# Patient Record
Sex: FEMALE | Race: WHITE | NOT HISPANIC OR LATINO | Employment: UNEMPLOYED | URBAN - METROPOLITAN AREA
[De-identification: names, ages, dates, MRNs, and addresses within clinical notes are randomized per-mention and may not be internally consistent; named-entity substitution may affect disease eponyms.]

---

## 2017-05-08 ENCOUNTER — TRANSCRIBE ORDERS (OUTPATIENT)
Dept: ADMINISTRATIVE | Facility: HOSPITAL | Age: 32
End: 2017-05-08

## 2017-05-08 ENCOUNTER — HOSPITAL ENCOUNTER (OUTPATIENT)
Dept: GENERAL RADIOLOGY | Facility: HOSPITAL | Age: 32
Discharge: HOME | End: 2017-05-08
Admitting: NURSE PRACTITIONER

## 2017-05-08 DIAGNOSIS — R07.81 RIB PAIN ON RIGHT SIDE: Primary | ICD-10-CM

## 2017-05-08 PROCEDURE — 71100 X-RAY EXAM RIBS UNI 2 VIEWS: CPT

## 2017-06-01 ENCOUNTER — APPOINTMENT (OUTPATIENT)
Dept: LAB | Facility: HOSPITAL | Age: 32
End: 2017-06-01
Attending: OBSTETRICS & GYNECOLOGY

## 2017-06-01 ENCOUNTER — OFFICE VISIT (OUTPATIENT)
Dept: OBSTETRICS AND GYNECOLOGY | Facility: CLINIC | Age: 32
End: 2017-06-01

## 2017-06-01 VITALS
HEIGHT: 65 IN | DIASTOLIC BLOOD PRESSURE: 60 MMHG | WEIGHT: 139 LBS | SYSTOLIC BLOOD PRESSURE: 112 MMHG | BODY MASS INDEX: 23.16 KG/M2

## 2017-06-01 DIAGNOSIS — N39.490 OVERFLOW INCONTINENCE: ICD-10-CM

## 2017-06-01 DIAGNOSIS — Z01.419 WOMEN'S ANNUAL ROUTINE GYNECOLOGICAL EXAMINATION: Primary | ICD-10-CM

## 2017-06-01 LAB
BILIRUB UR QL STRIP: NEGATIVE
CLARITY UR: CLEAR
COLOR UR: YELLOW
GLUCOSE UR STRIP-MCNC: NEGATIVE MG/DL
HGB UR QL STRIP.AUTO: NEGATIVE
KETONES UR QL STRIP: NEGATIVE
LEUKOCYTE ESTERASE UR QL STRIP.AUTO: NEGATIVE
NITRITE UR QL STRIP: NEGATIVE
PH UR STRIP.AUTO: <=5 [PH] (ref 5–8)
PROT UR QL STRIP: NEGATIVE
SP GR UR STRIP: 1.02 (ref 1–1.03)
UROBILINOGEN UR QL STRIP: NORMAL

## 2017-06-01 PROCEDURE — 81003 URINALYSIS AUTO W/O SCOPE: CPT | Performed by: OBSTETRICS & GYNECOLOGY

## 2017-06-01 PROCEDURE — 99395 PREV VISIT EST AGE 18-39: CPT | Performed by: OBSTETRICS & GYNECOLOGY

## 2017-06-01 RX ORDER — LEVOCETIRIZINE DIHYDROCHLORIDE 5 MG/1
5 TABLET, FILM COATED ORAL EVERY EVENING
COMMUNITY
End: 2023-03-14

## 2017-06-01 RX ORDER — ERGOCALCIFEROL 1.25 MG/1
50000 CAPSULE ORAL WEEKLY
COMMUNITY
Start: 2017-05-11 | End: 2019-04-22

## 2017-06-01 NOTE — PROGRESS NOTES
"Subjective   Chief Complaint   Patient presents with   • Gynecologic Exam     trouble holding bladder, worse in the last couple months     Geeta Negrete is a 31 y.o. year old  presenting to be seen for her annual exam.    Current birth control method: condoms.    Patient's last menstrual period was 2017.    She is sexually active.   Condoms are typically used.  No pain or problems    Past 6 month menstrual history:    Cycle Frequency: vary between 30 and 40 days   Menstrual cycle character: flow is typically normal   Cycle Duration: 5 - 6   Number of heavy days of flows: 3   Intermenstrual bleeding present: {no   Post-coital bleeding present: no     She exercises regularly: yes.  Calcium intake: yes.  She performs self breast exam:no.  She has concerns about domestic violence: no.    The following portions of the patient's history were reviewed and updated as appropriate:problem list, current medications, allergies, past family history, past medical history, past social history and past surgical history.    Review of Systems    normal  Bowels but more urgency in past 2 months with leakage ; does Kegels no help  Objective   /60  Ht 65\" (165.1 cm)  Wt 139 lb (63 kg)  LMP 2017 Comment: 30-40 cycle days  BMI 23.13 kg/m2     General:  well developed; well nourished  no acute distress   Skin:  No suspicious lesions seen   Thyroid: not examined   Breasts:  Examined in supine position  Symmetric without masses or skin dimpling  Nipples normal without inversion, lesions or discharge  There are no palpable axillary nodes   Abdomen: soft, non-tender; no masses  no umbilical or inginual hernias are present  no hepato-splenomegaly   Pelvis: Clinical staff was present for exam  External genitalia:  normal appearance of the external genitalia including Bartholin's and [place]'s glands.  :  urethral meatus normal; urethral hypermobility is absent.  Vaginal:  normal pink mucosa without prolapse or " lesions.  Cervix:  normal appearance. pap done  Uterus:  normal size, shape and consistency.  Adnexa:  strong Kegel response  Rectal:  digital rectal exam not performed; anus visually normal appearing.     Lab Review   No data reviewed    Imaging  No data reviewed       Assessment   1. Normal GYN exam.  Discussed self breast exam/awareness  2. Overflow incontinence with a very strong Kegel response  3. History of low vitamin D  4. History of postpartum hypertension with normal blood pressure today     Plan   1. Kegel exercises when necessary and check urinalysis to rule out UTI as cause   2. self breast exam or awareness day 6 of cycle  3. Prenatal vitamins with folic acid 2 months prior to consideration of future pregnancy      Medications Rx this encounter:  New Medications Ordered This Visit   Medications   • vitamin D (ERGOCALCIFEROL) 59873 UNITS capsule capsule     Sig: Take 50,000 Units by mouth 1 (One) Time Per Week.   • levocetirizine (XYZAL) 5 MG tablet     Sig: Take 5 mg by mouth Every Evening.          This note was electronically signed.    Renny Thao MD  6/1/2017

## 2017-10-30 ENCOUNTER — HOSPITAL ENCOUNTER (OUTPATIENT)
Dept: GENERAL RADIOLOGY | Facility: HOSPITAL | Age: 32
Discharge: HOME | End: 2017-10-30
Admitting: NURSE PRACTITIONER

## 2017-10-30 ENCOUNTER — TRANSCRIBE ORDERS (OUTPATIENT)
Dept: ADMINISTRATIVE | Facility: HOSPITAL | Age: 32
End: 2017-10-30

## 2017-10-30 DIAGNOSIS — R51.9 PAIN OF SCALP: Primary | ICD-10-CM

## 2017-10-30 PROCEDURE — 70260 X-RAY EXAM OF SKULL: CPT

## 2019-04-16 ENCOUNTER — INITIAL PRENATAL (OUTPATIENT)
Dept: OBSTETRICS AND GYNECOLOGY | Facility: CLINIC | Age: 34
End: 2019-04-16

## 2019-04-16 DIAGNOSIS — O02.1 MISSED ABORTION: Primary | ICD-10-CM

## 2019-04-16 DIAGNOSIS — Z36.89 ENCOUNTER TO ESTABLISH GESTATIONAL AGE USING ULTRASOUND: Primary | ICD-10-CM

## 2019-04-16 PROCEDURE — 99215 OFFICE O/P EST HI 40 MIN: CPT | Performed by: OBSTETRICS & GYNECOLOGY

## 2019-04-16 NOTE — PROGRESS NOTES
Subjective     Chief Complaint   Patient presents with   • Pregnancy Ultrasound       Geeta Negrete is a 33 y.o. year old .  Patient's last menstrual period was 2019..  She presents to be seen to initiate prenatal care with our practice.    Ultrasound reveals early pregnancy failure at 7 weeks. Her symptoms have been very minimal. Small amount of brown discharge and minimal cramping yesterday.  We discussed the diagnosis. We discussed the usual incidence and etiology of missed ab. We discussed management options including expectant management and the evolution of spontaneous  the advantages and disadvantages of this approach. We discussed the use of medical induction of  with cytotec and the likely outcome, and risk associated with this approach including prolonged bleeding and the need for curettage. We discussed the use of suction curettage and the risk of anesthesia as well as surgical risk of bleeding infection and damage to internal organs. We discussed the lack of affect on future pregnancy as well as the natural physical and emotional consequences of an early pregnancy failure. I advise her to wait until she has completed one normal spontaneous menstrul period until considering to thry to achieve pregnancy in the future. Her blood type is Rh positive.    The following portions of the patient's history were reviewed and updated as appropriate:vital signs, allergies, current medications, past medical history, past social history, past surgical history and problem list.    Review of Systems - 14 point reviewed and negative    Objective     Physical Exam   Not performed today  Lab Review   No data reviewed    Imaging   Pelvic ultrasound report    Assessment/Plan     ASSESSMENT  1. Early pregnancy failure at 7 weeks  2. Considering therapeutic options    PLAN  1. Tests ordered today:  No orders of the defined types were placed in this encounter.    2. Medications prescribed  today:  No orders of the defined types were placed in this encounter.    3. Will call with questions or decisions regarding management  4.       Follow up: 2 week(s) post resolution of missed     I personally spent 30 minutes  of a total 45  minutes face to face with the patient in counseling and discussion and/or coordination of care as described above regarding the diagnosis, management, short and long term consequences of various strategies with the new and unexpected diagnosis of early pregnancy failure.         This note was electronically signed.    Dejon Barrett MD  2019

## 2019-04-22 ENCOUNTER — OFFICE VISIT (OUTPATIENT)
Dept: OBSTETRICS AND GYNECOLOGY | Facility: CLINIC | Age: 34
End: 2019-04-22

## 2019-04-22 ENCOUNTER — ROUTINE PRENATAL (OUTPATIENT)
Dept: OBSTETRICS AND GYNECOLOGY | Facility: CLINIC | Age: 34
End: 2019-04-22

## 2019-04-22 VITALS — DIASTOLIC BLOOD PRESSURE: 74 MMHG | SYSTOLIC BLOOD PRESSURE: 124 MMHG | BODY MASS INDEX: 23.89 KG/M2 | WEIGHT: 148 LBS

## 2019-04-22 DIAGNOSIS — O03.9 COMPLETE ABORTION: ICD-10-CM

## 2019-04-22 DIAGNOSIS — O20.9 VAGINAL BLEEDING IN PREGNANCY, FIRST TRIMESTER: ICD-10-CM

## 2019-04-22 DIAGNOSIS — O02.1 MISSED ABORTION: Primary | ICD-10-CM

## 2019-04-22 DIAGNOSIS — O03.9 COMPLETE ABORTION: Primary | ICD-10-CM

## 2019-04-22 PROCEDURE — 99212 OFFICE O/P EST SF 10 MIN: CPT | Performed by: OBSTETRICS & GYNECOLOGY

## 2019-04-22 RX ORDER — CHLORAL HYDRATE 500 MG
CAPSULE ORAL
COMMUNITY
End: 2022-12-08

## 2019-08-30 ENCOUNTER — TELEPHONE (OUTPATIENT)
Dept: OBSTETRICS AND GYNECOLOGY | Facility: CLINIC | Age: 34
End: 2019-08-30

## 2019-08-30 NOTE — TELEPHONE ENCOUNTER
Patient called in said she was late on her period.  Her bleeding is now becoming very heavy.  Her urine pregnancy test were negative.  Patient was reassured that this could be normal.  If she continues to come in next week.  She could call back to discuss any other problems or go to the emergency room for work-up this week.    Doug Zhao MD

## 2019-09-03 ENCOUNTER — OFFICE VISIT (OUTPATIENT)
Dept: OBSTETRICS AND GYNECOLOGY | Facility: CLINIC | Age: 34
End: 2019-09-03

## 2019-09-03 ENCOUNTER — TELEPHONE (OUTPATIENT)
Dept: OBSTETRICS AND GYNECOLOGY | Facility: CLINIC | Age: 34
End: 2019-09-03

## 2019-09-03 ENCOUNTER — APPOINTMENT (OUTPATIENT)
Dept: LAB | Facility: HOSPITAL | Age: 34
End: 2019-09-03

## 2019-09-03 VITALS — HEIGHT: 66 IN | BODY MASS INDEX: 25.07 KG/M2 | WEIGHT: 156 LBS

## 2019-09-03 DIAGNOSIS — N92.1 MENORRHAGIA WITH IRREGULAR CYCLE: Primary | ICD-10-CM

## 2019-09-03 LAB
DEPRECATED RDW RBC AUTO: 46.3 FL (ref 37–54)
ERYTHROCYTE [DISTWIDTH] IN BLOOD BY AUTOMATED COUNT: 12.9 % (ref 12.3–15.4)
HCG INTACT+B SERPL-ACNC: 12.89 MIU/ML
HCT VFR BLD AUTO: 43.6 % (ref 34–46.6)
HGB BLD-MCNC: 14.3 G/DL (ref 12–15.9)
MCH RBC QN AUTO: 31.8 PG (ref 26.6–33)
MCHC RBC AUTO-ENTMCNC: 32.8 G/DL (ref 31.5–35.7)
MCV RBC AUTO: 97.1 FL (ref 79–97)
PLATELET # BLD AUTO: 249 10*3/MM3 (ref 140–450)
PMV BLD AUTO: 10.3 FL (ref 6–12)
RBC # BLD AUTO: 4.49 10*6/MM3 (ref 3.77–5.28)
WBC NRBC COR # BLD: 5.71 10*3/MM3 (ref 3.4–10.8)

## 2019-09-03 PROCEDURE — 99213 OFFICE O/P EST LOW 20 MIN: CPT | Performed by: OBSTETRICS & GYNECOLOGY

## 2019-09-03 PROCEDURE — 84702 CHORIONIC GONADOTROPIN TEST: CPT | Performed by: OBSTETRICS & GYNECOLOGY

## 2019-09-03 PROCEDURE — 36415 COLL VENOUS BLD VENIPUNCTURE: CPT | Performed by: OBSTETRICS & GYNECOLOGY

## 2019-09-03 PROCEDURE — 85027 COMPLETE CBC AUTOMATED: CPT | Performed by: OBSTETRICS & GYNECOLOGY

## 2019-09-03 NOTE — TELEPHONE ENCOUNTER
Patient was called and informed that her hematocrit was 43.  Regnancy test still pending.    Doug Zhao MD

## 2019-09-03 NOTE — PROGRESS NOTES
Subjective   Geeta Negrete is a 33 y.o. female is here today as a self referral.    Chief Complaint   Patient presents with   • Gynecologic Exam     c/o Last period was late and lasted 15 days, neg pregnancy test        History of Present Illness  Patient's last normal period was the second week in July.  She has been trying to become pregnant.  Her August period was late.  Pregnancy test was negative.  Patient started spotting on August 20.  About 5 to 7 days later the patient began bleeding heavily and is continued bleeding until today.  She is done for her 5 urine pregnancy test which were all negative.  Patient has a 4-year-old daughter and had no problems becoming pregnant at that time.  She presents today for diagnosis and treatment of heavy vaginal bleeding.  The following portions of the patient's history were reviewed and updated as appropriate: allergies, current medications, past family history, past medical history, past social history, past surgical history and problem list.    Review of Systems - Negative except for present illness    Objective   General:  well developed; well nourished  no acute distress   Skin:  Not performed.   Thyroid: not examined   Breasts:  Not performed.   Abdomen: soft, non-tender; no masses  no umbilical or inguinal hernias are present  no hepato-splenomegaly   Heart: regular rate and rhythm, S1, S2 normal, no murmur, click, rub or gallop   Lungs: clear to auscultation   Pelvis: Clinical staff was present for exam  External genitalia:  normal appearance of the external genitalia including Bartholin's and [place]'s glands.  :  urethral meatus normal;  Vaginal:  normal pink mucosa without prolapse or lesions. blood present -  large amount;  Cervix:  normal appearance. blood is seen coming from external cervical os;  Uterus:  normal size, shape and consistency. Anteverted;  Adnexa:  normal bimanual exam of the adnexa.  Rectal:  digital rectal exam not performed; anus  visually normal appearing.         Assessment/Plan   Geeta was seen today for gynecologic exam.    Diagnoses and all orders for this visit:    Menorrhagia with irregular cycle  -     US Non-ob Transvaginal  -     CBC (No Diff)  -     hCG, Quantitative, Pregnancy      Ultrasound shows an endometrium that appears to be in the secretory phase.  No evidence of pregnancy  Continue Motrin 800 mg  Return in 1 week    Doug Zhao MD

## 2019-09-04 ENCOUNTER — TELEPHONE (OUTPATIENT)
Dept: OBSTETRICS AND GYNECOLOGY | Facility: CLINIC | Age: 34
End: 2019-09-04

## 2019-09-04 NOTE — TELEPHONE ENCOUNTER
Patient was called to discuss her lab work from yesterday.  There is no answer but a message was left to return the call.    Doug Zhao MD     Patient returned to telephone call was informed that her beta-hCG was positive.  Her menorrhalgia was secondary to spontaneous early .  Patient will follow-up if bleeding continues.    Doug Zhao MD

## 2019-10-11 ENCOUNTER — OFFICE VISIT (OUTPATIENT)
Dept: OBSTETRICS AND GYNECOLOGY | Facility: CLINIC | Age: 34
End: 2019-10-11

## 2019-10-11 VITALS
BODY MASS INDEX: 25.23 KG/M2 | HEIGHT: 66 IN | WEIGHT: 157 LBS | DIASTOLIC BLOOD PRESSURE: 68 MMHG | SYSTOLIC BLOOD PRESSURE: 122 MMHG

## 2019-10-11 DIAGNOSIS — N92.6 IRREGULAR PERIODS: Primary | ICD-10-CM

## 2019-10-11 PROCEDURE — 99212 OFFICE O/P EST SF 10 MIN: CPT | Performed by: OBSTETRICS & GYNECOLOGY

## 2019-10-11 NOTE — PROGRESS NOTES
Subjective   Geeta Negrete is a 33 y.o. female is here today for follow-up.    Chief Complaint   Patient presents with   • Follow-up     gyn follow up. here to check progesterone levels         History of Present Illness  Patient returns after her first period post spontaneous .  Will check serum progesterone level in the secretory phase of her cycle.  She has had no problems after the miscarriage  The following portions of the patient's history were reviewed and updated as appropriate: allergies, current medications, past family history, past medical history, past social history, past surgical history and problem list.    Review of Systems - Negative      Objective   General:  well developed; well nourished  no acute distress   Skin:  Not performed.   Thyroid: not examined   Breasts:  Not performed.   Abdomen: Not performed.   Heart: regular rate and rhythm, S1, S2 normal, no murmur, click, rub or gallop   Lungs: clear to auscultation   Pelvis: Not performed.         Assessment/Plan   Geeta was seen today for follow-up.    Diagnoses and all orders for this visit:    Irregular periods  -     Progesterone; Future      Return as needed    Doug Zhao MD

## 2019-10-15 ENCOUNTER — LAB (OUTPATIENT)
Dept: LAB | Facility: HOSPITAL | Age: 34
End: 2019-10-15

## 2019-10-15 DIAGNOSIS — N92.6 IRREGULAR PERIODS: ICD-10-CM

## 2019-10-15 LAB — PROGEST SERPL-MCNC: 0.08 NG/ML

## 2019-10-15 PROCEDURE — 84144 ASSAY OF PROGESTERONE: CPT

## 2019-10-15 PROCEDURE — 36415 COLL VENOUS BLD VENIPUNCTURE: CPT

## 2019-10-22 ENCOUNTER — TELEPHONE (OUTPATIENT)
Dept: OBSTETRICS AND GYNECOLOGY | Facility: CLINIC | Age: 34
End: 2019-10-22

## 2019-10-22 NOTE — TELEPHONE ENCOUNTER
Patient was called to discuss lab results.  There was no answer but a message was left to return call.    Doug Zhao MD

## 2019-10-23 ENCOUNTER — TELEPHONE (OUTPATIENT)
Dept: OBSTETRICS AND GYNECOLOGY | Facility: CLINIC | Age: 34
End: 2019-10-23

## 2019-10-23 ENCOUNTER — LAB (OUTPATIENT)
Dept: LAB | Facility: HOSPITAL | Age: 34
End: 2019-10-23

## 2019-10-23 DIAGNOSIS — N92.6 IRREGULAR PERIODS: Primary | ICD-10-CM

## 2019-10-23 DIAGNOSIS — N92.6 IRREGULAR PERIODS: ICD-10-CM

## 2019-10-23 LAB — PROGEST SERPL-MCNC: 16.3 NG/ML

## 2019-10-23 PROCEDURE — 36415 COLL VENOUS BLD VENIPUNCTURE: CPT

## 2019-10-23 PROCEDURE — 84144 ASSAY OF PROGESTERONE: CPT

## 2019-10-23 NOTE — TELEPHONE ENCOUNTER
Patient was called.  Her serum progesterone returned and was 16.30 ng/mL.  This is in the luteal phase range.  Patient was given these results.  She will call when her period starts.    Doug Zhao MD

## 2020-07-14 ENCOUNTER — TRANSCRIBE ORDERS (OUTPATIENT)
Dept: ADMINISTRATIVE | Facility: HOSPITAL | Age: 35
End: 2020-07-14

## 2020-07-14 DIAGNOSIS — Z82.49 FAMILY HISTORY OF MI (MYOCARDIAL INFARCTION): Primary | ICD-10-CM

## 2020-08-18 ENCOUNTER — HOSPITAL ENCOUNTER (OUTPATIENT)
Dept: CARDIOLOGY | Facility: HOSPITAL | Age: 35
Discharge: HOME | End: 2020-08-18
Admitting: STUDENT IN AN ORGANIZED HEALTH CARE EDUCATION/TRAINING PROGRAM

## 2020-08-18 DIAGNOSIS — Z82.49 FAMILY HISTORY OF MI (MYOCARDIAL INFARCTION): ICD-10-CM

## 2020-08-18 LAB
BH CV ECHO MEAS - AO MAX PG (FULL): 2.7 MMHG
BH CV ECHO MEAS - AO MAX PG: 6.1 MMHG
BH CV ECHO MEAS - AO MEAN PG (FULL): 1.4 MMHG
BH CV ECHO MEAS - AO MEAN PG: 3.3 MMHG
BH CV ECHO MEAS - AO ROOT AREA (BSA CORRECTED): 0.93
BH CV ECHO MEAS - AO ROOT AREA: 2.2 CM^2
BH CV ECHO MEAS - AO ROOT DIAM: 1.7 CM
BH CV ECHO MEAS - AO V2 MAX: 123.4 CM/SEC
BH CV ECHO MEAS - AO V2 MEAN: 84.1 CM/SEC
BH CV ECHO MEAS - AO V2 VTI: 25.8 CM
BH CV ECHO MEAS - AVA(I,A): 2 CM^2
BH CV ECHO MEAS - AVA(I,D): 2 CM^2
BH CV ECHO MEAS - AVA(V,A): 2.3 CM^2
BH CV ECHO MEAS - AVA(V,D): 2.3 CM^2
BH CV ECHO MEAS - BSA(HAYCOCK): 1.8 M^2
BH CV ECHO MEAS - BSA: 1.8 M^2
BH CV ECHO MEAS - BZI_BMI: 26.1 KILOGRAMS/M^2
BH CV ECHO MEAS - BZI_METRIC_HEIGHT: 165.1 CM
BH CV ECHO MEAS - BZI_METRIC_WEIGHT: 71.2 KG
BH CV ECHO MEAS - EDV(CUBED): 91.4 ML
BH CV ECHO MEAS - EDV(MOD-SP2): 89 ML
BH CV ECHO MEAS - EDV(MOD-SP4): 82 ML
BH CV ECHO MEAS - EDV(TEICH): 92.7 ML
BH CV ECHO MEAS - EF(CUBED): 78.7 %
BH CV ECHO MEAS - EF(MOD-BP): 70 %
BH CV ECHO MEAS - EF(MOD-SP2): 67.4 %
BH CV ECHO MEAS - EF(MOD-SP4): 72 %
BH CV ECHO MEAS - EF(TEICH): 71.1 %
BH CV ECHO MEAS - ESV(CUBED): 19.5 ML
BH CV ECHO MEAS - ESV(MOD-SP2): 29 ML
BH CV ECHO MEAS - ESV(MOD-SP4): 23 ML
BH CV ECHO MEAS - ESV(TEICH): 26.8 ML
BH CV ECHO MEAS - FS: 40.3 %
BH CV ECHO MEAS - IVS/LVPW: 1.1
BH CV ECHO MEAS - IVSD: 0.74 CM
BH CV ECHO MEAS - LA DIMENSION: 2.4 CM
BH CV ECHO MEAS - LA/AO: 1.5
BH CV ECHO MEAS - LAD MAJOR: 4.3 CM
BH CV ECHO MEAS - LAT PEAK E' VEL: 18.8 CM/SEC
BH CV ECHO MEAS - LATERAL E/E' RATIO: 5.2
BH CV ECHO MEAS - LV DIASTOLIC VOL/BSA (35-75): 45.9 ML/M^2
BH CV ECHO MEAS - LV IVRT: 0.07 SEC
BH CV ECHO MEAS - LV MASS(C)D: 98.7 GRAMS
BH CV ECHO MEAS - LV MASS(C)DI: 55.3 GRAMS/M^2
BH CV ECHO MEAS - LV MAX PG: 3.4 MMHG
BH CV ECHO MEAS - LV MEAN PG: 1.8 MMHG
BH CV ECHO MEAS - LV SYSTOLIC VOL/BSA (12-30): 12.9 ML/M^2
BH CV ECHO MEAS - LV V1 MAX: 91.9 CM/SEC
BH CV ECHO MEAS - LV V1 MEAN: 63.8 CM/SEC
BH CV ECHO MEAS - LV V1 VTI: 17.1 CM
BH CV ECHO MEAS - LVIDD: 4.5 CM
BH CV ECHO MEAS - LVIDS: 2.7 CM
BH CV ECHO MEAS - LVLD AP2: 7.1 CM
BH CV ECHO MEAS - LVLD AP4: 6.1 CM
BH CV ECHO MEAS - LVLS AP2: 5.8 CM
BH CV ECHO MEAS - LVLS AP4: 5.1 CM
BH CV ECHO MEAS - LVOT AREA (M): 3.1 CM^2
BH CV ECHO MEAS - LVOT AREA: 3.1 CM^2
BH CV ECHO MEAS - LVOT DIAM: 2 CM
BH CV ECHO MEAS - LVPWD: 0.7 CM
BH CV ECHO MEAS - MED PEAK E' VEL: 14.7 CM/SEC
BH CV ECHO MEAS - MEDIAL E/E' RATIO: 6.6
BH CV ECHO MEAS - MV A MAX VEL: 50.6 CM/SEC
BH CV ECHO MEAS - MV DEC SLOPE: 391.2 CM/SEC^2
BH CV ECHO MEAS - MV DEC TIME: 0.2 SEC
BH CV ECHO MEAS - MV E MAX VEL: 98.1 CM/SEC
BH CV ECHO MEAS - MV E/A: 1.9
BH CV ECHO MEAS - MV MAX PG: 5.6 MMHG
BH CV ECHO MEAS - MV MEAN PG: 2.6 MMHG
BH CV ECHO MEAS - MV P1/2T MAX VEL: 125.9 CM/SEC
BH CV ECHO MEAS - MV P1/2T: 94.2 MSEC
BH CV ECHO MEAS - MV V2 MAX: 118.5 CM/SEC
BH CV ECHO MEAS - MV V2 MEAN: 76.4 CM/SEC
BH CV ECHO MEAS - MV V2 VTI: 35.2 CM
BH CV ECHO MEAS - MVA P1/2T LCG: 1.7 CM^2
BH CV ECHO MEAS - MVA(P1/2T): 2.3 CM^2
BH CV ECHO MEAS - MVA(VTI): 1.5 CM^2
BH CV ECHO MEAS - PA ACC SLOPE: 469.2 CM/SEC^2
BH CV ECHO MEAS - PA ACC TIME: 0.17 SEC
BH CV ECHO MEAS - PA PR(ACCEL): 1.4 MMHG
BH CV ECHO MEAS - RAP SYSTOLE: 10 MMHG
BH CV ECHO MEAS - RV MAX PG: 1.5 MMHG
BH CV ECHO MEAS - RV V1 MAX: 60.7 CM/SEC
BH CV ECHO MEAS - RVSP: 28 MMHG
BH CV ECHO MEAS - SI(AO): 31.1 ML/M^2
BH CV ECHO MEAS - SI(CUBED): 40.3 ML/M^2
BH CV ECHO MEAS - SI(LVOT): 29.5 ML/M^2
BH CV ECHO MEAS - SI(MOD-SP2): 33.6 ML/M^2
BH CV ECHO MEAS - SI(MOD-SP4): 33.1 ML/M^2
BH CV ECHO MEAS - SI(TEICH): 36.9 ML/M^2
BH CV ECHO MEAS - SV(AO): 55.5 ML
BH CV ECHO MEAS - SV(CUBED): 72 ML
BH CV ECHO MEAS - SV(LVOT): 52.7 ML
BH CV ECHO MEAS - SV(MOD-SP2): 60 ML
BH CV ECHO MEAS - SV(MOD-SP4): 59 ML
BH CV ECHO MEAS - SV(TEICH): 65.9 ML
BH CV ECHO MEAS - TAPSE (>1.6): 2 CM2
BH CV ECHO MEAS - TR MAX PG: 18 MMHG
BH CV ECHO MEAS - TR MAX VEL: 213 CM/SEC
BH CV ECHO MEASUREMENTS AVERAGE E/E' RATIO: 5.86
BH CV VAS BP LEFT ARM: NORMAL MMHG
BH CV XLRA - RV BASE: 3.4 CM
BH CV XLRA - RV LENGTH: 5.1 CM
BH CV XLRA - RV MID: 2.5 CM
BH CV XLRA - TDI S': 12.7 CM/SEC
LEFT ATRIUM VOLUME INDEX: 14 ML/M^2
LEFT ATRIUM VOLUME: 25 ML
LV EF 2D ECHO EST: 65 %
MAXIMAL PREDICTED HEART RATE: 187 BPM
STRESS TARGET HR: 159 BPM

## 2020-08-18 PROCEDURE — 93306 TTE W/DOPPLER COMPLETE: CPT

## 2020-08-18 PROCEDURE — 93306 TTE W/DOPPLER COMPLETE: CPT | Performed by: INTERNAL MEDICINE

## 2020-08-28 ENCOUNTER — OFFICE VISIT (OUTPATIENT)
Dept: OBSTETRICS AND GYNECOLOGY | Facility: CLINIC | Age: 35
End: 2020-08-28

## 2020-08-28 VITALS — HEIGHT: 65 IN | WEIGHT: 157.6 LBS | BODY MASS INDEX: 26.26 KG/M2 | RESPIRATION RATE: 14 BRPM

## 2020-08-28 DIAGNOSIS — Z01.419 WELL WOMAN EXAM WITH ROUTINE GYNECOLOGICAL EXAM: ICD-10-CM

## 2020-08-28 DIAGNOSIS — Z01.419 WELL WOMAN EXAM WITH ROUTINE GYNECOLOGICAL EXAM: Primary | ICD-10-CM

## 2020-08-28 PROCEDURE — 99395 PREV VISIT EST AGE 18-39: CPT | Performed by: OBSTETRICS & GYNECOLOGY

## 2020-08-28 NOTE — PROGRESS NOTES
Subjective   Chief Complaint   Patient presents with   • Gynecologic Exam     Patient is here today for her annual and pap smear, no complaints     Geeta Negrete is a 34 y.o. year old  presenting to be seen for her annual exam.  Patient has been trying to become pregnant for about the last year.  She had a miscarriage at 8 weeks in the spring 2019.  She does not want to pursue any infertility studies at this time.  Her cycles are 28 to 30 days.  She is done operatory predictor kits and has passed.  She is having no GYN problems.    SEXUAL Hx:  She is currently sexually active.  In the past year there has not been new sexual partners.    Condoms are not typically used.  She would not like to be screened for STD's at today's exam.  Current birth control method: not using any form of contraception because she is currently trying to conceive.  She is happy with her current method of contraception and does not want to discuss alternative methods of contraception.  MENSTRUAL Hx:  No LMP recorded (lmp unknown).  In the past 6 months her cycles have been regular, predictable and occur monthly.  Her menstrual flow is normal.   Each month on average there are roughly 1 day(s) of very heavy flow.    Intermenstrual bleeding is absent.    Post-coital bleeding is absent.  Dysmenorrhea: is not affecting her activities of daily living  PMS: is not affecting her activities of daily living  Her cycles are not a source of concern for her that she wishes to discuss today.  HEALTH Hx:  She exercises regularly:yes.  She wears her seat belt:yes.  She has concerns about domestic violence: no.  OTHER COMPLAINTS:  Nothing else    The following portions of the patient's history were reviewed and updated as appropriate:problem list, current medications, allergies, past family history, past medical history, past social history and past surgical history.    Social History    Tobacco Use      Smoking status: Never Smoker       "Smokeless tobacco: Never Used      Tobacco comment: Both parents did/do smoke    Review of Systems  Review of Systems - History obtained from the patient  General ROS: negative  Psychological ROS: negative  ENT ROS: negative  Allergy and Immunology ROS: positive for - seasonal allergies  Hematological and Lymphatic ROS: negative  Endocrine ROS: negative  Breast ROS: negative for breast lumps  Respiratory ROS: no cough, shortness of breath, or wheezing  Cardiovascular ROS: no chest pain or dyspnea on exertion  Gastrointestinal ROS: no abdominal pain, change in bowel habits, or black or bloody stools  Genito-Urinary ROS: no dysuria, trouble voiding, or hematuria  Musculoskeletal ROS: negative  Neurological ROS: no TIA or stroke symptoms  Dermatological ROS: negative        Objective   Resp 14   Ht 165.1 cm (65\")   Wt 71.5 kg (157 lb 9.6 oz)   LMP  (LMP Unknown)   Breastfeeding No   BMI 26.23 kg/m²     General:  well developed; well nourished  no acute distress   Skin:  No suspicious lesions seen   Thyroid: not examined   Breasts:  Examined in supine position  Symmetric without masses or skin dimpling  Nipples normal without inversion, lesions or discharge  There are no palpable axillary nodes   Abdomen: soft, non-tender; no masses  no umbilical or inguinal hernias are present  no hepato-splenomegaly   Heart: regular rate and rhythm, S1, S2 normal, no murmur, click, rub or gallop   Lungs: clear to auscultation   Pelvis: Clinical staff was present for exam  External genitalia:  normal appearance of the external genitalia including Bartholin's and [place]'s glands.  :  urethral meatus normal;  Vaginal:  normal pink mucosa without prolapse or lesions.  Cervix:  normal appearance. Pap smear was done  Uterus:  normal size, shape and consistency.  Adnexa:  normal bimanual exam of the adnexa.  Rectal:  digital rectal exam not performed; anus visually normal appearing.          Assessment/Plan   Geeta was seen today for " gynecologic exam.    Diagnoses and all orders for this visit:    Well woman exam with routine gynecological exam  -     Pap IG, HPV-hr; Future       Return in 1 year or sooner if pregnant    This note was electronically signed.    Doug Zhao MD   August 28, 2020

## 2022-09-01 ENCOUNTER — HOSPITAL ENCOUNTER (OUTPATIENT)
Dept: GENERAL RADIOLOGY | Facility: HOSPITAL | Age: 37
Discharge: HOME | End: 2022-09-01
Admitting: INTERNAL MEDICINE

## 2022-09-01 ENCOUNTER — TRANSCRIBE ORDERS (OUTPATIENT)
Dept: ADMINISTRATIVE | Facility: HOSPITAL | Age: 37
End: 2022-09-01

## 2022-09-01 DIAGNOSIS — R10.11 RIGHT UPPER QUADRANT PAIN: ICD-10-CM

## 2022-09-01 DIAGNOSIS — R10.11 RIGHT UPPER QUADRANT PAIN: Primary | ICD-10-CM

## 2022-09-01 PROCEDURE — 71100 X-RAY EXAM RIBS UNI 2 VIEWS: CPT

## 2022-09-01 PROCEDURE — 72072 X-RAY EXAM THORAC SPINE 3VWS: CPT

## 2022-12-04 PROBLEM — Z01.419 WELL WOMAN EXAM: Status: ACTIVE | Noted: 2022-12-04

## 2022-12-04 PROBLEM — O02.1 MISSED ABORTION: Status: RESOLVED | Noted: 2019-04-16 | Resolved: 2022-12-04

## 2022-12-08 ENCOUNTER — OFFICE VISIT (OUTPATIENT)
Dept: OBSTETRICS AND GYNECOLOGY | Facility: CLINIC | Age: 37
End: 2022-12-08

## 2022-12-08 VITALS
RESPIRATION RATE: 14 BRPM | DIASTOLIC BLOOD PRESSURE: 78 MMHG | WEIGHT: 149 LBS | BODY MASS INDEX: 24.79 KG/M2 | SYSTOLIC BLOOD PRESSURE: 118 MMHG

## 2022-12-08 DIAGNOSIS — Z01.419 WELL WOMAN EXAM: Primary | ICD-10-CM

## 2022-12-08 DIAGNOSIS — Z31.69 ENCOUNTER FOR PRECONCEPTION CONSULTATION: ICD-10-CM

## 2022-12-08 DIAGNOSIS — Z71.85 VACCINE COUNSELING: ICD-10-CM

## 2022-12-08 PROBLEM — N39.490 OVERFLOW INCONTINENCE: Status: RESOLVED | Noted: 2017-06-01 | Resolved: 2022-12-08

## 2022-12-08 PROCEDURE — 99395 PREV VISIT EST AGE 18-39: CPT | Performed by: OBSTETRICS & GYNECOLOGY

## 2022-12-08 RX ORDER — ERGOCALCIFEROL (VITAMIN D2) 10 MCG
400 TABLET ORAL DAILY
COMMUNITY

## 2022-12-08 NOTE — PROGRESS NOTES
Subjective   Chief Complaint   Patient presents with   • Gynecologic Exam     Geeta Negrete is a 36 y.o. year old  presenting to be seen for her annual exam.     SEXUAL Hx:  She is currently sexually active.  In the past year there there has been NO new sexual partners.    Condoms are never used.  She would not like to be screened for STD's at today's exam.  Current birth control method: not using any form of contraception.  She is not trying to conceive but would be OK if she did get pregnant.  She is happy with her current method of contraception and does not want to discuss alternative methods of contraception.  MENSTRUAL Hx:  Patient's last menstrual period was 2022 (approximate).  In the past 6 months her cycles have been regular, predictable and occur monthly.  Her menstrual flow is typically normal.   Each month on average there are roughly 0 day(s) of very heavy flow.  Intermenstrual bleeding is absent.    Post-coital bleeding is absent.  Dysmenorrhea: is not affecting her activities of daily living  PMS: is not affecting her activities of daily living  Her cycles are not a source of concern for her that she wishes to discuss today.  HEALTH Hx:  She exercises regularly: yes.  She wears her seat belt: yes.  She has concerns about domestic violence: no.  OTHER THINGS SHE WANTS TO DISCUSS TODAY:  Nothing else    The following portions of the patient's history were reviewed and updated as appropriate:problem list, current medications, allergies, past family history, past medical history, past social history and past surgical history.    Social History    Tobacco Use      Smoking status: Never      Smokeless tobacco: Never      Tobacco comments: Both parents did/do smoke    Review of Systems  Constitutional POS: nothing reported    NEG: anorexia or night sweats   Genitourinary POS: nothing reported    NEG: dysuria or hematuria      Gastointestinal POS: nothing reported    NEG: bloating,  change in bowel habits, melena or reflux symptoms   Integument POS: nothing reported    NEG: moles that are changing in size, shape, color or rashes   Breast POS: nothing reported    NEG: persistent breast lump, skin dimpling or nipple discharge        Objective   /78   Resp 14   Wt 67.6 kg (149 lb)   LMP 12/05/2022 (Approximate)   BMI 24.79 kg/m²     General:  well developed; well nourished  no acute distress   Skin:  No suspicious lesions seen   Thyroid: normal to inspection and palpation   Breasts:  Examined in supine position  Symmetric without masses or skin dimpling  Nipples normal without inversion, lesions or discharge  There are no palpable axillary nodes   Abdomen: soft, non-tender; no masses  no umbilical or inguinal hernias are present  no hepato-splenomegaly   Pelvis: Clinical staff was present for exam  External genitalia:  normal appearance of the external genitalia including Bartholin's and [place]'s glands.  :  urethral meatus normal;  Vaginal:  normal pink mucosa without prolapse or lesions.  Cervix:  normal appearance.  Uterus:  normal size, shape and consistency.  Adnexa:  normal bimanual exam of the adnexa.  Rectal:  digital rectal exam not performed; anus visually normal appearing.        Assessment   1. Normal GYN exam  2. Sub-optimal BC - she currently is taking sufficient folic acid  3. She is up to date on all relevant gynecologic and colorectal screenings     Plan   1. Pap was done today.  If she does not receive the results of the Pap within 2 weeks  time, she was instructed to call to find out the results.  I explained to Geeta that the recommendations for Pap smear interval in a low risk patient's has lengthened to 3 years time.  I encouraged her to be seen yearly for a full physical exam including breast and pelvic exam even during the off years when PAP's will not be performed.  2. Folic acid for the prevention of neural tube defects was discussed.  At least 0.4 mg should  be taken preconceptionally to reduce the risk.  It was explained that this may reduce the baseline incidence of neural tube defect by as much as 65%.  Folic acid can be found in OTC multivitamins, OTC prenatal vitamins or breakfast cereal that that contains 100% of the RDA of folate.  3. Her vaccine record was reviewed and updated.  4. I discussed with Geeta that she may be behind on needed vaccinations for Influenza, TDAP and COVID booster.  She may be able to obtain these vaccinations at her local pharmacy OR speak about obtaining them with her primary care.  If she does obtain her vaccines, I have asked Geeta to let us know the date each vaccine was obtained so that her medical record could be updated in our system.  5. The importance of keeping all planned follow-up and taking all medications as prescribed was emphasized.  6. Follow up for annual exam 1 year         This note was electronically signed.    Arnold Cartagena M.D.  December 8, 2022    Part of this note may be an electronic transcription/translation of spoken language to printed text using the Dragon Dictation System.

## 2022-12-08 NOTE — PATIENT INSTRUCTIONS
Tdap Vaccine (Tetanus, Diphtheria and Pertussis): What You Need to Know      1. Why get vaccinated?  Tetanus, diphtheria and pertussis are very serious diseases. Tdap vaccine can protect us from these diseases. And, Tdap vaccine given to pregnant women can protect  babies against pertussis..  TETANUS (Lockjaw) is rare in the [place] today. It causes painful muscle tightening and stiffness, usually all over the body.  It can lead to tightening of muscles in the head and neck so you can't open your mouth, swallow, or sometimes even breathe. Tetanus kills about 1 out of 10 people who are infected even after receiving the best medical care.  DIPHTHERIA is also rare in the [place] today. It can cause a thick coating to form in the back of the throat.  It can lead to breathing problems, heart failure, paralysis, and death.  PERTUSSIS (Whooping Cough) causes severe coughing spells, which can cause difficulty breathing, vomiting and disturbed sleep.  It can also lead to weight loss, incontinence, and rib fractures. Up to 2 in 100 adolescents and 5 in 100 adults with pertussis are hospitalized or have complications, which could include pneumonia or death.    These diseases are caused by bacteria. Diphtheria and pertussis are spread from person to person through secretions from coughing or sneezing. Tetanus enters the body through cuts, scratches, or wounds.  Before vaccines, as many as 200,000 cases of diphtheria, 200,000 cases of pertussis, and hundreds of cases of tetanus, were reported in the [place] each year. Since vaccination began, reports of cases for tetanus and diphtheria have dropped by about 99% and for pertussis by about 80%.    2. Tdap vaccine  Tdap vaccine can protect adolescents and adults from tetanus, diphtheria, and pertussis. One dose of Tdap is routinely given at age 11 or 12. People who did not get Tdap at that age should get it as soon as possible.  Tdap is especially important  for healthcare professionals and anyone having close contact with a baby younger than 12 months.  Pregnant women should get a dose of Tdap during every pregnancy, to protect the  from pertussis. Infants are most at risk for severe, life-threatening complications from pertussis.  Another vaccine, called Td, protects against tetanus and diphtheria, but not pertussis. A Td booster should be given every 10 years. Tdap may be given as one of these boosters if you have never gotten Tdap before. Tdap may also be given after a severe cut or burn to prevent tetanus infection.  Your doctor or the person giving you the vaccine can give you more information.  Tdap may safely be given at the same time as other vaccines.    3. Some people should not get this vaccine  A person who has ever had a life-threatening allergic reaction after a previous dose of any diphtheria, tetanus or pertussis containing vaccine, OR has a severe allergy to any part of this vaccine, should not get Tdap vaccine. Tell the person giving the vaccine about any severe allergies.  Anyone who had coma or long repeated seizures within 7 days after a childhood dose of DTP or DTaP, or a previous dose of Tdap, should not get Tdap, unless a cause other than the vaccine was found. They can still get Td.  Talk to your doctor if you:  have seizures or another nervous system problem,  had severe pain or swelling after any vaccine containing diphtheria, tetanus or pertussis,  ever had a condition called Guillain-Barré Syndrome (GBS),  aren't feeling well on the day the shot is scheduled.    4. Risks  With any medicine, including vaccines, there is a chance of side effects. These are usually mild and go away on their own. Serious reactions are also possible but are rare.  Most people who get Tdap vaccine do not have any problems with it.  Mild problems following Tdap  (Did not interfere with activities)  Pain where the shot was given (about 3 in 4 adolescents or  2 in 3 adults)  Redness or swelling where the shot was given (about 1 person in 5)  Mild fever of at least 100.4°F (up to about 1 in 25 adolescents or 1 in 100 adults)  Headache (about 3 or 4 people in 10)  Tiredness (about 1 person in 3 or 4)  Nausea, vomiting, diarrhea, stomach ache (up to 1 in 4 adolescents or 1 in 10 adults)  Chills, sore joints (about 1 person in 10)  Body aches (about 1 person in 3 or 4)  Rash, swollen glands (uncommon)  Moderate problems following Tdap  (Interfered with activities, but did not require medical attention)  Pain where the shot was given (up to 1 in 5 or 6)  Redness or swelling where the shot was given (up to about 1 in 16 adolescents or 1 in 12 adults)  Fever over 102°F (about 1 in 100 adolescents or 1 in 250 adults)  Headache (about 1 in 7 adolescents or 1 in 10 adults)  Nausea, vomiting, diarrhea, stomach ache (up to 1 or 3 people in 100)  Swelling of the entire arm where the shot was given (up to about 1 in 500).  Severe problems following Tdap  (Unable to perform usual activities; required medical attention)  Swelling, severe pain, bleeding and redness in the arm where the shot was given (rare).  Problems that could happen after any vaccine:  People sometimes faint after a medical procedure, including vaccination. Sitting or lying down for about 15 minutes can help prevent fainting, and injuries caused by a fall. Tell your doctor if you feel dizzy, or have vision changes or ringing in the ears.  Some people get severe pain in the shoulder and have difficulty moving the arm where a shot was given. This happens very rarely.  Any medication can cause a severe allergic reaction. Such reactions from a vaccine are very rare, estimated at fewer than 1 in a million doses, and would happen within a few minutes to a few hours after the vaccination.  As with any medicine, there is a very remote chance of a vaccine causing a serious injury or death.  The safety of vaccines is always  being monitored. For more information, visit: www.cdc.gov/vaccinesafety/    5. What if there is a serious problem?  What should I look for?  Look for anything that concerns you, such as signs of a severe allergic reaction, very high fever, or unusual behavior.  Signs of a severe allergic reaction can include hives, swelling of the face and throat, difficulty breathing, a fast heartbeat, dizziness, and weakness. These would usually start a few minutes to a few hours after the vaccination.  What should I do?  If you think it is a severe allergic reaction or other emergency that can't wait, call 9-1-1 or get the person to the nearest hospital. Otherwise, call your doctor.  Afterward, the reaction should be reported to the Vaccine Adverse Event Reporting System (VAERS). Your doctor might file this report, or you can do it yourself through the VAERS web site at www.vaers.hhs.gov, or by calling 1-112.467.2973.  VAERS does not give medical advice.    6. The National Vaccine Injury Compensation Program  The National Vaccine Injury Compensation Program (VICP) is a federal program that was created to compensate people who may have been injured by certain vaccines.  Persons who believe they may have been injured by a vaccine can learn about the program and about filing a claim by calling 1-334.403.7419 or visiting the VICP website at www.hrsa.gov/vaccinecompensation. There is a time limit to file a claim for compensation.    7. How can I learn more?  Ask your doctor. He or she can give you the vaccine package insert or suggest other sources of information.  Call your local or state health department.  Contact the Centers for Disease Control and Prevention (CDC):  Call 1-779.440.7630 (3-336-DRU-INFO) or  Visit CDC's website at www.cdc.gov/vaccines      Vaccine Information Statement Tdap Vaccine (2/24/2015)  This information is not intended to replace advice given to you by your health care provider. Make sure you discuss any  questions you have with your health care provider.  Document Released: 06/18/2013 Document Revised: 08/05/2019 Document Reviewed: 08/05/2019  [employer] Interactive Patient Education © 2019 [employer]              Preventing Birth Defects with Folic Acid  What is folic acid?  Folic acid is a vitamin that is used by the body to create new cells and keep the blood healthy. Everyone needs folic acid to stay healthy. It is especially important if you are pregnant.  Folic acid is artificial (synthetic). The vitamin in its natural form is called folate. Some foods are natural sources of folate, and other foods have folic acid added to them (fortified foods). You can also buy folic acid supplements or vitamins that contain folic acid.  Why is folic acid important during pregnancy?  Folic acid helps reduce your baby's risk of serious birth defects, especially the class of birth defects called neural tube defects (NTD).  Types of NTD's include:  Spina bifida. Spina bifida occurs when a baby's spinal column does not develop completely. This can cause serious, long-term (chronic) disabilities.  Anencephaly. This is a birth defect that causes your baby to be missing parts of the brain, scalp, and skull when he or she is born. Most babies born with anencephaly only live for a short amount of time.  How much folic acid do I need?  If you plan to become pregnant, you should take at least 400 mcg (micrograms) of folic acid daily. Birth defects usually occur in the earliest stages of pregnancy, often before you know you are pregnant. Taking folic acid when you start trying to become pregnant can help prevent birth defects.  While you are pregnant, you need at least 600 mcg of folic acid each day.  If you have had a child with a NTD, you may be recommended you take at least 5,000 mcg of folic acid daily.  If you take certain medications (certain medications for control of seizures) you made need more the 400 mcg.  What nutrition  changes can be made?  To increase your intake of folate and folic acid, you may:  Eat more foods that are fortified with folic acid. Check food labels to see whether a food contains folic acid. Foods that are commonly fortified with folic acid include:  Cereals.  Pastas.  Flours.  White rice.  Breads.  Eat more foods that are natural sources of folate, such as:  Legumes, including lentils, peas, and beans.  Nuts.  Vegetables, especially dark green leafy vegetables, such as spinach and mustard greens.  Citrus fruits and juices.  Your health care provider may still recommend that you take a supplement to make sure that you get enough to reduce the risk of birth defects.  Where to find support  Talk with your health care provider or your pharmacist to find a folic acid supplement that is right for you.  Your health care provider may recommend that you see a nutrition specialist (nutritionist). A nutritionist can help you make healthy food choices and get more folate from your diet.  Nutrition education programs may be available through your community health department.  Where to find more information  Visit the following websites to learn more about the importance of folic acid.  Centers for Disease Control and Prevention: www.cdc.gov/ncbddd/folicacid/about.html  The Office on Women's Health: womenshealth.gov/publications/our-publications/fact-sheet/folic-acid.html  National Institutes of Health: ods.od.nih.gov/factsheets/Folate-Consumer  The March of Dimes: www.marchofdimes.org/pregnancy/folic-acid.aspx  Summary  It is important to start taking folic acid when you are planning to become pregnant, because many birth defects can happen before you know that you are pregnant.  You can get more folate and folic acid from your diet. Look for certain foods that are fortified with folic acid.  Your health care provider may recommend that you take a supplement to get enough folic acid.    This information is not intended to  replace advice given to you by your health care provider. Make sure you discuss any questions you have with your health care provider.  Document Released: 01/18/2017 Document Revised: 11/30/2018 Document Reviewed: 01/18/2017  Elsevier Patient Education © 2020 [employer] Inc.

## 2022-12-12 LAB — REF LAB TEST METHOD: NORMAL

## 2023-03-14 ENCOUNTER — OFFICE VISIT (OUTPATIENT)
Dept: GASTROENTEROLOGY | Facility: CLINIC | Age: 38
End: 2023-03-14
Payer: COMMERCIAL

## 2023-03-14 VITALS
BODY MASS INDEX: 25.26 KG/M2 | WEIGHT: 151.6 LBS | HEART RATE: 94 BPM | DIASTOLIC BLOOD PRESSURE: 80 MMHG | SYSTOLIC BLOOD PRESSURE: 130 MMHG | HEIGHT: 65 IN | TEMPERATURE: 97.3 F

## 2023-03-14 DIAGNOSIS — R10.11 RIGHT UPPER QUADRANT ABDOMINAL PAIN: Primary | ICD-10-CM

## 2023-03-14 DIAGNOSIS — K76.9 LIVER LESION: ICD-10-CM

## 2023-03-14 PROCEDURE — 99203 OFFICE O/P NEW LOW 30 MIN: CPT | Performed by: NURSE PRACTITIONER

## 2023-03-14 NOTE — PROGRESS NOTES
GASTROENTEROLOGY OFFICE NOTE  Geeta Negrete  [id number]  1985    CARE TEAM  Patient Care Team:  Chon Rodriguez DO as PCP - General (Family Medicine)  Arnold Cartagena MD as Obstetrician (Obstetrics and Gynecology)    Referring Provider: Chon Rodriguez DO    Chief Complaint   Patient presents with   • Hepatic Disease        HISTORY OF PRESENT ILLNESS:  Patient is a 37-year-old female who presents today with an 8-year history of right upper quadrant abdominal pain that radiates around into her back.    She reports that after her pregnancy 8 years ago was when she first noticed the pain.  It was an intermittent pain described as a burning, stabbing type sensation.  She has never been able to identify any contributing factors specifically, meals, bowel pattern, movement none of which seem to make the pain any better or worse.  She reports that she feels it mostly at nighttime.    About 6 months ago, the pain began occurring more frequently.  Although it was not a constant pain it did occur on a daily basis and a little more severe.  She had an ultrasound of the right upper quadrant in September 2022 that showed a couple of lesions within the liver that were favored to be cyst.  Lesion in the left hepatic lobe had a mild irregular capsule contour and there was heterogeneous echotexture of the liver noted.  Subsequent CT in October 2022 shows numerous subcentimeter hypodense lesions scattered in the liver that are too small to definitively characterize but are likely cysts or biliary hamartomas.  Liver enzymes are normal; AST 15/ALT 18/ALP 67.    Additionally, she reports that she underwent some genetic testing that was suggestive of celiac disease.  She has made diet modifications for celiac disease, avoiding gluten since January 2023 reports that since avoidance of gluten her right upper quadrant pain seems to have resolved entirely.  She was also feeling quite fatigued and this has resolved  "since avoiding gluten as well.    PAST MEDICAL HISTORY  History reviewed. No pertinent past medical history.     PAST SURGICAL HISTORY  Past Surgical History:   Procedure Laterality Date   • WISDOM TOOTH EXTRACTION  2008        MEDICATIONS:    Current Outpatient Medications:   •  Prenatal Vit-Fe Fumarate-FA (PRENATAL, CLASSIC, VITAMIN) 28-0.8 MG tablet tablet, Take  by mouth Daily., Disp: , Rfl:   •  Vitamin D, Cholecalciferol, (CHOLECALCIFEROL) 10 MCG (400 UNIT) tablet, Take 1 tablet by mouth Daily., Disp: , Rfl:     ALLERGIES  No Known Allergies    FAMILY HISTORY:  Family History   Problem Relation Age of Onset   • Hypertension Mother         Quadruple Bypass 2009, PAD sx 2011&2018   • Pulmonary embolism Father         Thought to be cause of death 5/2011   • Hypertension Father    • Hypertension Maternal Grandmother    • Hypertension Maternal Grandfather    • Colon cancer Neg Hx        SOCIAL HISTORY  Social History     Socioeconomic History   • Marital status:    Tobacco Use   • Smoking status: Never   • Smokeless tobacco: Never   • Tobacco comments:     Both parents did/do smoke   Vaping Use   • Vaping Use: Never used   Substance and Sexual Activity   • Alcohol use: Yes     Comment: 1-3 drinks per week   • Drug use: No   • Sexual activity: Yes     Partners: Male         PHYSICAL EXAM   /80 (BP Location: Left arm, Patient Position: Sitting, Cuff Size: Adult)   Pulse 94   Temp 97.3 °F (36.3 °C) (Temporal)   Ht 165.1 cm (65\")   Wt 68.8 kg (151 lb 9.6 oz)   BMI 25.23 kg/m²   Physical Exam  Vitals and nursing note reviewed.   Constitutional:       Appearance: Normal appearance. She is well-developed.   HENT:      Head: Normocephalic and atraumatic.      Nose: Nose normal.      Mouth/Throat:      Mouth: Mucous membranes are moist.      Pharynx: Oropharynx is clear.   Eyes:      Pupils: Pupils are equal, round, and reactive to light.   Cardiovascular:      Rate and Rhythm: Normal rate and regular " rhythm.   Pulmonary:      Effort: Pulmonary effort is normal.      Breath sounds: Normal breath sounds. No wheezing or rales.   Abdominal:      General: Bowel sounds are normal.      Palpations: Abdomen is soft. There is no mass.      Tenderness: There is no abdominal tenderness. There is no guarding or rebound.      Hernia: No hernia is present.   Musculoskeletal:         General: Normal range of motion.      Cervical back: Normal range of motion and neck supple.   Skin:     General: Skin is warm and dry.   Neurological:      Mental Status: She is alert and oriented to person, place, and time.      Cranial Nerves: No cranial nerve deficit.   Psychiatric:         Behavior: Behavior normal.         Judgment: Judgment normal.         Results Review:              ASSESSMENT / PLAN  Diagnoses and all orders for this visit:    1. Right upper quadrant abdominal pain (Primary)    2. Liver lesion     >> Right upper quadrant abdominal pain has resolved with avoiding gluten.  For now, we will take a wait-and-see approach, she is going to continue to avoid gluten and we will reassess in 3 months.  If the pain has reoccurred or she has other symptoms we will plan for an EGD.  >> Follow-up CT in 3 months for liver lesions    Return in about 3 months (around 6/14/2023).    I discussed the patients findings and my recommendations with patient    SHAVONNE Kelley

## 2023-09-19 NOTE — TELEPHONE ENCOUNTER
Left msg for pt sister advised to contact clinic regarding issue with mbss. Speech therapist will perform test if pt sister ok with it and Dr[facility] Mendiola and [facility] can figure out what is missing to [facility], as [facility] provider office but there was no callback.   Patient returned to telephone call and lab results was discussed.    Doug Zhao MD

## 2023-11-29 ENCOUNTER — TELEPHONE (OUTPATIENT)
Dept: OBSTETRICS AND GYNECOLOGY | Facility: CLINIC | Age: 38
End: 2023-11-29
Payer: COMMERCIAL

## 2023-11-29 NOTE — TELEPHONE ENCOUNTER
Caller: CadenMalcomGeetabee Llamas    Relationship: Self    Best call back number: [phone] CALL ANYTIME, IT IS OKAY TO [facility]    Who is your current provider:     Who would you like your new provider to be: [facility]    What are your reasons for transferring care: PERSONAL PREFERENCE    Additional notes: GYN PATIENT IS REQUESTING TO TRANSFER CARE TO . SENDING TELEPHONE ENCOUNTERS TO BOTH PROVIDERS PER HUB REFERENCE TOOL

## 2023-11-29 NOTE — TELEPHONE ENCOUNTER
Caller: CadenMalcomGeetabee Llamas    Relationship: Self    Best call back number: [phone] CALL ANYTIME, IT IS OKAY TO [facility]    Who is your current provider:     Who would you like your new provider to be: [facility]    What are your reasons for transferring care: PERSONAL PREFERENCE    Additional notes: GYN PATIENT IS REQUESTING TO TRANSFER CARE TO . SENDING TELEPHONE ENCOUNTERS TO BOTH PROVIDERS PER HUB REFERENCE TOOL

## 2023-11-29 NOTE — TELEPHONE ENCOUNTER
Called and spoke with patient, patient not pregnant, just wanting to switch for annual care, etc.  Scheduled accordingly.

## 2023-12-11 ENCOUNTER — HOSPITAL ENCOUNTER (OUTPATIENT)
Dept: GENERAL RADIOLOGY | Facility: HOSPITAL | Age: 38
Discharge: HOME | End: 2023-12-11
Admitting: NURSE PRACTITIONER
Payer: COMMERCIAL

## 2023-12-11 ENCOUNTER — TRANSCRIBE ORDERS (OUTPATIENT)
Dept: ADMINISTRATIVE | Facility: HOSPITAL | Age: 38
End: 2023-12-11
Payer: COMMERCIAL

## 2023-12-11 DIAGNOSIS — M25.511 RIGHT SHOULDER PAIN, UNSPECIFIED CHRONICITY: Primary | ICD-10-CM

## 2023-12-11 PROCEDURE — 73030 X-RAY EXAM OF SHOULDER: CPT

## 2024-02-14 ENCOUNTER — OFFICE VISIT (OUTPATIENT)
Dept: OBSTETRICS AND GYNECOLOGY | Facility: CLINIC | Age: 39
End: 2024-02-14
Payer: COMMERCIAL

## 2024-02-14 ENCOUNTER — LAB (OUTPATIENT)
Dept: LAB | Facility: HOSPITAL | Age: 39
End: 2024-02-14
Payer: COMMERCIAL

## 2024-02-14 VITALS
SYSTOLIC BLOOD PRESSURE: 118 MMHG | RESPIRATION RATE: 14 BRPM | DIASTOLIC BLOOD PRESSURE: 74 MMHG | BODY MASS INDEX: 25.49 KG/M2 | WEIGHT: 153.2 LBS

## 2024-02-14 DIAGNOSIS — Z01.419 WELL WOMAN EXAM: Primary | ICD-10-CM

## 2024-02-14 DIAGNOSIS — N94.3 PMS (PREMENSTRUAL SYNDROME): ICD-10-CM

## 2024-02-14 DIAGNOSIS — R00.2 PALPITATIONS: ICD-10-CM

## 2024-02-14 LAB
HBA1C MFR BLD: 5 % (ref 4.8–5.6)
TSH SERPL DL<=0.05 MIU/L-ACNC: 2.24 UIU/ML (ref 0.27–4.2)

## 2024-02-14 PROCEDURE — 84443 ASSAY THYROID STIM HORMONE: CPT

## 2024-02-14 PROCEDURE — 83036 HEMOGLOBIN GLYCOSYLATED A1C: CPT

## 2024-02-16 LAB — REF LAB TEST METHOD: NORMAL

## 2024-02-20 ENCOUNTER — OFFICE VISIT (OUTPATIENT)
Dept: CARDIOLOGY | Facility: HOSPITAL | Age: 39
End: 2024-02-20
Payer: COMMERCIAL

## 2024-02-20 ENCOUNTER — HOSPITAL ENCOUNTER (OUTPATIENT)
Dept: CARDIOLOGY | Facility: HOSPITAL | Age: 39
Discharge: HOME | End: 2024-02-20
Payer: COMMERCIAL

## 2024-02-20 VITALS
RESPIRATION RATE: 18 BRPM | OXYGEN SATURATION: 100 % | HEART RATE: 85 BPM | DIASTOLIC BLOOD PRESSURE: 62 MMHG | BODY MASS INDEX: 25.96 KG/M2 | WEIGHT: 155.8 LBS | TEMPERATURE: 97.1 F | HEIGHT: 65 IN | SYSTOLIC BLOOD PRESSURE: 115 MMHG

## 2024-02-20 DIAGNOSIS — R00.2 PALPITATIONS: Primary | ICD-10-CM

## 2024-02-20 DIAGNOSIS — R00.2 PALPITATIONS: ICD-10-CM

## 2024-02-20 PROCEDURE — 99214 OFFICE O/P EST MOD 30 MIN: CPT | Performed by: NURSE PRACTITIONER

## 2024-02-20 PROCEDURE — 93242 EXT ECG>48HR<7D RECORDING: CPT

## 2024-02-20 NOTE — PROGRESS NOTES
"Chief Complaint  Palpitations    Subjective      History of Present Illness {  Problem List  Visit  Diagnosis   Encounters  Notes  Medications  Labs  Result Review Imaging  Media :23}     Geeta Negrete, 38 y.o. female with palpitations presents to [facility] for Palpitations.    Patient presents upon referral from OB/GYN provider Dr. Centeno for evaluation of palpitations.    Presents today with intermittent palpitations for years. No identifiable factors, but possibly worsened around menstrual cycle, but worsened over the past month. No recent medication change; illness back in January and daughter with influenza. Palpitations described as a brief skipped heartbeat or flutter; lasting less than 20-30 seconds. Walking 3-4 miles a few times per week with no cardiac complaints. No lightheadedness, near syncope/syncope. Prior holter in  with no arrhythmias reported. Recent lab work with PCP. Father  at ag 64yo with PE, mother with CABG at age 54yo -smoker.       Objective     Vital Signs:   Vitals:    24 0912 24 0913 24 0914   BP: 123/61 119/70 115/62   BP Location: Right arm Left arm Left arm   Patient Position: Sitting Standing Sitting   Cuff Size: Adult Adult Adult   Pulse: 87 108 85   Resp:   18   Temp: 97.1 °F (36.2 °C) 97.1 °F (36.2 °C) 97.1 °F (36.2 °C)   TempSrc: Temporal Temporal Temporal   SpO2: 100% 100% 100%   Weight:   70.7 kg (155 lb 12.8 oz)   Height:   165.1 cm (65\")     Body mass index is 25.93 kg/m².  Physical Exam  Vitals and nursing note reviewed.   Constitutional:       Appearance: Normal appearance.   HENT:      Head: Normocephalic.   Eyes:      Extraocular Movements: Extraocular movements intact.   Neck:      Vascular: No carotid bruit.   Cardiovascular:      Rate and Rhythm: Normal rate and regular rhythm.      Pulses: Normal pulses.      Heart sounds: Normal heart sounds, S1 normal and S2 normal. No murmur " heard.  Pulmonary:      Effort: Pulmonary effort is normal. No respiratory distress.      Breath sounds: Normal breath sounds.   Musculoskeletal:      Cervical back: Neck supple.      Right lower leg: No edema.      Left lower leg: No edema.   Skin:     General: Skin is warm and dry.   Neurological:      General: No focal deficit present.      Mental Status: She is alert.   Psychiatric:         Mood and Affect: Mood normal.         Behavior: Behavior normal.         Thought Content: Thought content normal.        Data Reviewed:{ Labs  Result Review  Imaging  Med Tab  Media :23}     Adult Transthoracic Echo Complete W/ Cont if Necessary Per Protocol (08/18/2020 12:07) NSR during echocardiogram.   Hemoglobin A1c (02/14/2024 11:38)  TSH (02/14/2024 11:38)  External lab work 12/11/23: HDL 77, triglycerides 42, . K 4.1, Cr 0.66. TSH 2.0, Hgb 15.21, plat 229.    Assessment & Plan   Assessment and Plan {CC Problem List  Visit Diagnosis  ROS  Review (Popup)  Health Maintenance  Quality  BestPractice  Medications  SmartSets  SnapShot Encounters  Media :23}     1. Palpitations  -History of palpitation with recently worsening symptoms.  Denies currently.  No exertional symptoms, chest pain.  -Recent lab work with normal TSH, electrolytes  -Previous cardiac monitor reported as normal with no significant arrhythmia (data deficit)  -Prior TTE with preserved LVEF, no significant valvular abnormality  - Holter Monitor - 14 Days; Future  - Adult Transthoracic Echo Complete W/ Cont if Necessary Per Protocol; Future  -Follow-up in approximately 1 month for recheck      Follow Up {Instructions Charge Capture  Follow-up Communications :23}     Return in about 1 month (around 3/20/2024) for Video visit, Monitor results, Results follow-up.    Time Spent: I spent 39 minutes caring for Geeta Negrete on this date of service. This time includes time spent by me in the following activities: preparing for  the visit, reviewing tests, obtaining and/or reviewing a separately obtained history, performing a medically appropriate examination and/or evaluation, counseling and educating the patient/family/caregiver, documenting information in the medical record and independently interpreting results and communicating that information with the patient/family/caregiver. All time noted occurred on the date of service.    Patient was given instructions and counseling regarding her condition or for health maintenance advice. Please see specific information pulled into the AVS if appropriate.  Patient was instructed to call the [facility] with any questions, concerns, or worsening symptoms.    Dictated Utilizing Dragon Dictation   Please note that portions of this note were completed with a voice recognition program.   Part of this note may be an electronic transcription/translation of spoken language to printed text using the Dragon Dictation System.

## 2024-02-20 NOTE — PATIENT INSTRUCTIONS
- Heart monitor for 2 weeks    - Office will call to schedule testing  - Office will call or send message with testing results    - CT coronary calcium score at age 42yo.   [facility]

## 2024-02-20 NOTE — PROGRESS NOTES
[facility] Heart Monitor Documentation    Geeta Negrete  1985  [id number]  02/20/24      [] ZIO XT Patch  Model [id number] Prescribed for  Days    Serial Number: (N + 9 Digits) N   Apply-By Date on Box:   USPS Tracking Number:   USPS Tracking        [] [employer] MINI PLUS Mobile Cardiac Telemetry  Model BGMINIPLUS Prescribed for  Days    Serial Number: (BGM + 7 Digits) BGM  Shipped-By Date on Box:   UPS Tracking Number: [id number]      [x] [employer] MINI Holter Monitor  Model BGMINIEL Prescribed for 14 Days    Serial Number: (7 Digits) [id number]  Shipped-By Date on Box: 02/09/2024  UPS Tracking Number: [id number]  UPS Tracking        This monitor was applied to the patient's chest and checked for proper functioning.  Ms. Geeta Negrete was instructed in the proper use of this monitor.  She was given the opportunity to ask questions and left the office with the device 's instruction manual.    Latoya Adorno CMA, 10:08 EST, 02/20/24                  [facility]

## 2024-03-05 ENCOUNTER — LAB (OUTPATIENT)
Dept: LAB | Facility: HOSPITAL | Age: 39
End: 2024-03-05
Payer: COMMERCIAL

## 2024-03-05 DIAGNOSIS — R03.0 ELEVATED BLOOD-PRESSURE READING, WITHOUT DIAGNOSIS OF HYPERTENSION: Primary | ICD-10-CM

## 2024-03-05 DIAGNOSIS — Z82.49 FAMILY HISTORY OF PREMATURE CAD: ICD-10-CM

## 2024-03-05 PROCEDURE — 83695 ASSAY OF LIPOPROTEIN(A): CPT

## 2024-03-05 PROCEDURE — 36415 COLL VENOUS BLD VENIPUNCTURE: CPT

## 2024-03-06 DIAGNOSIS — E78.41 ELEVATED LIPOPROTEIN(A): ICD-10-CM

## 2024-03-06 DIAGNOSIS — Z82.49 FAMILY HISTORY OF PREMATURE CAD: Primary | ICD-10-CM

## 2024-03-06 DIAGNOSIS — R00.2 PALPITATIONS: ICD-10-CM

## 2024-03-06 DIAGNOSIS — R03.0 ELEVATED BLOOD-PRESSURE READING, WITHOUT DIAGNOSIS OF HYPERTENSION: ICD-10-CM

## 2024-03-06 LAB — LPA SERPL-SCNC: 83.6 NMOL/L

## 2024-03-12 ENCOUNTER — TELEPHONE (OUTPATIENT)
Dept: CARDIOLOGY | Facility: HOSPITAL | Age: 39
End: 2024-03-12
Payer: COMMERCIAL

## 2024-03-12 NOTE — TELEPHONE ENCOUNTER
----- Message from SHAVONNE Flaherty sent at 3/11/2024  4:16 PM EDT -----  Regarding: Echo results  Hello,     Could you let Geeta know:    Your echocardiogram shows that your heart contracts normally, and there are no significant valve abnormalities noted. Cardiac valves normal in structure. Normal pericardium.     Please continue with follow-up as scheduled. Cardiology should be calling soon to schedule establishing care visit.      Thanks

## 2024-03-13 ENCOUNTER — LAB (OUTPATIENT)
Dept: LAB | Facility: HOSPITAL | Age: 39
End: 2024-03-13
Payer: COMMERCIAL

## 2024-03-13 DIAGNOSIS — Z82.49 FAMILY HISTORY OF PREMATURE CAD: ICD-10-CM

## 2024-03-13 DIAGNOSIS — R03.0 ELEVATED BLOOD-PRESSURE READING, WITHOUT DIAGNOSIS OF HYPERTENSION: ICD-10-CM

## 2024-03-13 DIAGNOSIS — R00.2 PALPITATIONS: ICD-10-CM

## 2024-03-13 DIAGNOSIS — E78.41 ELEVATED LIPOPROTEIN(A): ICD-10-CM

## 2024-03-13 LAB
ALBUMIN SERPL-MCNC: 4.5 G/DL (ref 3.5–5.2)
ALBUMIN/GLOB SERPL: 1.7 G/DL
ALP SERPL-CCNC: 54 U/L (ref 39–117)
ALT SERPL W P-5'-P-CCNC: 17 U/L (ref 1–33)
ANION GAP SERPL CALCULATED.3IONS-SCNC: 13.3 MMOL/L (ref 5–15)
AST SERPL-CCNC: 19 U/L (ref 1–32)
BILIRUB SERPL-MCNC: 0.5 MG/DL (ref 0–1.2)
BUN SERPL-MCNC: 11 MG/DL (ref 6–20)
BUN/CREAT SERPL: 15.5 (ref 7–25)
CALCIUM SPEC-SCNC: 9 MG/DL (ref 8.6–10.5)
CHLORIDE SERPL-SCNC: 100 MMOL/L (ref 98–107)
CHOLEST SERPL-MCNC: 172 MG/DL (ref 0–200)
CO2 SERPL-SCNC: 24.7 MMOL/L (ref 22–29)
CREAT SERPL-MCNC: 0.71 MG/DL (ref 0.57–1)
DEPRECATED RDW RBC AUTO: 41.3 FL (ref 37–54)
EGFRCR SERPLBLD CKD-EPI 2021: 112.5 ML/MIN/1.73
ERYTHROCYTE [DISTWIDTH] IN BLOOD BY AUTOMATED COUNT: 12.2 % (ref 12.3–15.4)
GLOBULIN UR ELPH-MCNC: 2.7 GM/DL
GLUCOSE SERPL-MCNC: 82 MG/DL (ref 65–99)
HCT VFR BLD AUTO: 42.2 % (ref 34–46.6)
HDLC SERPL-MCNC: 72 MG/DL (ref 40–60)
HGB BLD-MCNC: 14 G/DL (ref 12–15.9)
LDLC SERPL CALC-MCNC: 93 MG/DL (ref 0–100)
LDLC/HDLC SERPL: 1.31 {RATIO}
MCH RBC QN AUTO: 30.6 PG (ref 26.6–33)
MCHC RBC AUTO-ENTMCNC: 33.2 G/DL (ref 31.5–35.7)
MCV RBC AUTO: 92.1 FL (ref 79–97)
PLATELET # BLD AUTO: 245 10*3/MM3 (ref 140–450)
PMV BLD AUTO: 10.4 FL (ref 6–12)
POTASSIUM SERPL-SCNC: 4.3 MMOL/L (ref 3.5–5.2)
PROT SERPL-MCNC: 7.2 G/DL (ref 6–8.5)
RBC # BLD AUTO: 4.58 10*6/MM3 (ref 3.77–5.28)
SODIUM SERPL-SCNC: 138 MMOL/L (ref 136–145)
TRIGL SERPL-MCNC: 30 MG/DL (ref 0–150)
VLDLC SERPL-MCNC: 7 MG/DL (ref 5–40)
WBC NRBC COR # BLD AUTO: 4.83 10*3/MM3 (ref 3.4–10.8)

## 2024-03-13 PROCEDURE — 85027 COMPLETE CBC AUTOMATED: CPT

## 2024-03-13 PROCEDURE — 36415 COLL VENOUS BLD VENIPUNCTURE: CPT

## 2024-03-13 PROCEDURE — 80053 COMPREHEN METABOLIC PANEL: CPT

## 2024-03-13 PROCEDURE — 80061 LIPID PANEL: CPT

## 2024-04-15 NOTE — PROGRESS NOTES
"[facility]   Consult  Geeta Negrete  1985    VISIT DATE:  04/16/24    PCP:   Bel Farley APRN  [address] [address]        CC:  Family HX/O Premature CAD      Problem List:   Family history CAD  Mother with CABG,  PAD,  TIA    Echo March 2024:  EF 60 to 65%  No significant valve abnormalities      History of Present Illness:  Geeta Negrete  Is a 38 y.o. female with pertinent cardiac history detailed above.  She had some recent episodes of palpitations.  Holter monitor did not show any significant arrhythmias or ectopy.  She also wanted to establish care for family history of CAD.  Mother with CABG at age 55.  Father had PE at age 63 patient's lipid panel was well-controlled but her lipoprotein a was mildly elevated at 83.6.  She does not have any chest pain or dyspnea on exertion.  She is otherwise healthy.  EKG shows rightward axis deviation but otherwise is normal.  She is accompanied by her  today.      Patient Active Problem List    Diagnosis Date Noted    Annual GYN exam 12/04/2022     Note Last Updated: 12/12/2022     SCREENING TESTS    Year 2018 2019 2020 2021 2022 2023 2024 2025 2026 2027 2028 2029 2030 2031 2032 2033 2034 2035 2036   Age                      PAP - - - - 12                 HPV high risk                      HANSEL [Birads]                      DEBBIE (5 year)  Jovanna Simeonick (lifetime)                      Colonoscopy                      DEXA [T-score]  Frax [hip/any]                      Lipids  [LDL / HDL / TG]                      Vitamin D                      TSH                        Enter the month test was performed.  If month not known, enter \"X'  Black numbers = normal results  Red numbers = abnormal results  Black X = patient reported normal  Red X - patient reported abnormal      Referred by:    Profession: [facility]   Other info: Young patient            No Known Allergies    Social History " "    Socioeconomic History    Marital status:    Tobacco Use    Smoking status: Never     Passive exposure: Never    Smokeless tobacco: Never    Tobacco comments:     Both parents did smoke   Vaping Use    Vaping status: Never Used   Substance and Sexual Activity    Alcohol use: Yes     Comment: 1-3 drinks per week    Drug use: Never    Sexual activity: Yes     Partners: Male     Birth control/protection: Condom, Natural family planning/Rhythm       Family History   Problem Relation Age of Onset    Hypertension Mother         Quadruple Bypass 2009, PAD sx 2011&2018, subclavian bypass 8/2021, carotid endarterectomy 9/2021, passed 7/2022 (thought to be due to heart disease/coronary artery disease)    Coronary artery disease Mother     Stroke Mother         TIA    Pulmonary embolism Father         Thought to be cause of death 5/2011    Hypertension Father     Hypertension Brother     No Known Problems Brother     Hypertension Maternal Grandmother     Hypertension Maternal Grandfather     Hypertension Paternal Grandmother     Hypertension Paternal Grandfather     Colon cancer Neg Hx     Breast cancer Neg Hx     Ovarian cancer Neg Hx     Endometrial cancer Neg Hx     Osteoporosis Neg Hx        Current Medications:    Current Outpatient Medications:     Prenatal Vit-Fe Fumarate-FA (PRENATAL, CLASSIC, VITAMIN) 28-0.8 MG tablet tablet, Take  by mouth Daily., Disp: , Rfl:     Vitamin D, Cholecalciferol, (CHOLECALCIFEROL) 10 MCG (400 UNIT) tablet, Take 1 tablet by mouth Daily., Disp: , Rfl:      Review of Systems   Cardiovascular:  Positive for irregular heartbeat and palpitations. Negative for chest pain and dyspnea on exertion.   Respiratory:  Negative for shortness of breath.        Vitals:    04/16/24 0950   BP: 124/80   BP Location: Right arm   Patient Position: Sitting   Pulse: 78   SpO2: 99%   Weight: 68.9 kg (152 lb)   Height: 166.4 cm (65.5\")       Physical Exam  Constitutional:       Appearance: Normal " appearance.   Cardiovascular:      Rate and Rhythm: Normal rate and regular rhythm.      Pulses: Normal pulses.      Heart sounds: Normal heart sounds.   Pulmonary:      Effort: Pulmonary effort is normal.      Breath sounds: Normal breath sounds.   Musculoskeletal:      Right lower leg: No edema.      Left lower leg: No edema.   Neurological:      Mental Status: She is alert.         Diagnostic Data:    ECG 12 Lead    Date/Time: 4/16/2024 10:45 AM  Performed by: Madhu Maldonado MD    Authorized by: Madhu Maldonado MD  Comparison: not compared with previous ECG   Rhythm: sinus rhythm  Rate: normal  BPM: 78  QRS axis: right    Clinical impression: non-specific ECG        Lab Results   Component Value Date    TRIG 30 03/13/2024    HDL 72 (H) 03/13/2024     Lab Results   Component Value Date    GLUCOSE 82 03/13/2024    BUN 11 03/13/2024    CREATININE 0.71 03/13/2024     03/13/2024    K 4.3 03/13/2024     03/13/2024    CO2 24.7 03/13/2024     Lab Results   Component Value Date    HGBA1C 5.00 02/14/2024     Lab Results   Component Value Date    WBC 4.83 03/13/2024    HGB 14.0 03/13/2024    HCT 42.2 03/13/2024     03/13/2024       Assessment:  No diagnosis found.    Plan:      Palpitations  Echo 60 to 65% EF, no valve abnormality  Holter monitor 2024: Normal study with no significant ectopy or arrhythmias  We will continue to monitor at this time  Family history of CAD  Total cholesterol 172, HDL 72, LDL 93, lipoprotein a 83.6 (normal less than 75)  Her lipoprotein a is elevated but still below currently defined threshold for increased cardiovascular risk  Discussed option of a coronary artery calcium score for further restratification.  She will consider this.  At this time I do not believe she requires statin therapy but we will continue to follow her risk factors closely.  We will follow-up in 1 year.        Advance Care Planning   ACP discussion was declined by the patient. Patient  does not have an advance directive, declines further assistance.       Madhu Maldonado MD [facility]

## 2024-04-16 ENCOUNTER — OFFICE VISIT (OUTPATIENT)
Dept: CARDIOLOGY | Facility: CLINIC | Age: 39
End: 2024-04-16
Payer: COMMERCIAL

## 2024-04-16 VITALS
OXYGEN SATURATION: 99 % | HEART RATE: 78 BPM | HEIGHT: 66 IN | DIASTOLIC BLOOD PRESSURE: 80 MMHG | WEIGHT: 152 LBS | BODY MASS INDEX: 24.43 KG/M2 | SYSTOLIC BLOOD PRESSURE: 124 MMHG

## 2024-04-16 DIAGNOSIS — R00.2 PALPITATIONS: Primary | ICD-10-CM

## 2024-04-16 PROCEDURE — 99214 OFFICE O/P EST MOD 30 MIN: CPT | Performed by: INTERNAL MEDICINE

## 2024-04-16 PROCEDURE — 93000 ELECTROCARDIOGRAM COMPLETE: CPT | Performed by: INTERNAL MEDICINE

## 2024-05-01 ENCOUNTER — HOSPITAL ENCOUNTER (OUTPATIENT)
Dept: CARDIOLOGY | Facility: HOSPITAL | Age: 39
Discharge: HOME | End: 2024-05-01

## 2024-05-01 DIAGNOSIS — Z00.6 ENCOUNTER FOR EXAMINATION FOR NORMAL COMPARISON AND CONTROL IN CLINICAL RESEARCH PROGRAM: ICD-10-CM

## 2025-03-19 ENCOUNTER — OFFICE VISIT (OUTPATIENT)
Dept: GASTROENTEROLOGY | Facility: CLINIC | Age: 40
End: 2025-03-19
Payer: COMMERCIAL

## 2025-03-19 VITALS
TEMPERATURE: 97.5 F | BODY MASS INDEX: 24.98 KG/M2 | DIASTOLIC BLOOD PRESSURE: 80 MMHG | SYSTOLIC BLOOD PRESSURE: 108 MMHG | HEIGHT: 66 IN | WEIGHT: 155.4 LBS | HEART RATE: 78 BPM

## 2025-03-19 DIAGNOSIS — Z80.0 FAMILY HISTORY OF COLON CANCER: ICD-10-CM

## 2025-03-19 DIAGNOSIS — K76.9 LIVER LESION: Primary | ICD-10-CM

## 2025-03-19 PROCEDURE — 99214 OFFICE O/P EST MOD 30 MIN: CPT

## 2025-03-19 RX ORDER — CHLORAL HYDRATE 500 MG
CAPSULE ORAL
COMMUNITY

## 2025-03-19 RX ORDER — LEVOCETIRIZINE DIHYDROCHLORIDE 5 MG/1
5 TABLET, FILM COATED ORAL
COMMUNITY

## 2025-03-19 NOTE — PROGRESS NOTES
Office Note     Name: Geeta Negrete    : 1985     MRN: [id number]     Chief Complaint  Abdominal Pain    Subjective     History of Present Illness:  History of Present Illness  The patient is a 39-year-old female who presents today for follow-up of abdominal pain and hepatic lesions.    She reports that her brother was recently diagnosed with stage 4 colon cancer. She has never undergone a colonoscopy, but does have colon cancer in multiple family members. She maintains a good appetite and has regular bowel movements daily. She reports no rectal bleeding or weight loss.    She was previously suspected to have celiac disease after genetic testing was suggestive of this, and reported that her previous right upper quadrant pain had completely resolved with a gluten-free diet. She has not had an EGD.  She adheres to a gluten-free diet most of the time, primarily consuming meat and vegetables, but occasionally indulges in bread. She has noticed that fried foods tend to exacerbate her symptoms.    She recalls experiencing similar symptoms in , which she attributed to stress following the unexpected loss of her mother and the closure of a family business. She reports a sporadic dull burning sensation, which does not seem to be associated with meals. She reports no dysphagia, nausea, or vomiting. She has not used proton pump inhibitors such as omeprazole or pantoprazole and does not experience heartburn or reflux. She avoids nonsteroidal anti-inflammatory drugs like ibuprofen, Aleve, and naproxen.     She had a CT scan of her liver in , which revealed some cysts, and other lesions that were too small to characterize, and she was advised to return for a follow-up scan, prompting her visit today.    FAMILY HISTORY  Her brother was diagnosed with stage IV colon cancer and is currently in remission. Another brother had polyps removed during a colonoscopy. A couple of uncles and an aunt on her  mother's side had colon cancer.    Past Medical History:   Past Medical History:   Diagnosis Date    Ovarian cyst     Recurrent pregnancy loss, antepartum condition or complication     One at around 8-9 weeks and another earlier on after doing blood tests(saw Dr. Carson)    Rh incompatibility     Possibly/Unknown on this answer- baby and I had different blood types and daughter was tested/checked for jaundice and had bilirubin levels    Varicella     As a child       Past Surgical History:   Past Surgical History:   Procedure Laterality Date    WISDOM TOOTH EXTRACTION  2008       Immunizations:   Immunization History   Administered Date(s) Administered    COVID-19 (PFIZER) Purple Cap Monovalent 04/08/2021, 05/06/2021        Medications:     Current Outpatient Medications:     levocetirizine (XYZAL) 5 MG tablet, Take 1 tablet by mouth., Disp: , Rfl:     Omega-3 Fatty Acids (fish oil) 1000 MG capsule capsule, Take  by mouth., Disp: , Rfl:     Prenatal Vit-Fe Fumarate-FA (PRENATAL, CLASSIC, VITAMIN) 28-0.8 MG tablet tablet, Take  by mouth Daily., Disp: , Rfl:     Vitamin D, Cholecalciferol, (CHOLECALCIFEROL) 10 MCG (400 UNIT) tablet, Take 1 tablet by mouth Daily., Disp: , Rfl:     Allergies:   No Known Allergies    Family History:   Family History   Problem Relation Age of Onset    Hypertension Mother         Quadruple Bypass 2009, PAD sx 2011&2018, subclavian bypass 8/2021, carotid endarterectomy 9/2021, passed 7/2022 (thought to be due to heart disease/coronary artery disease)    Coronary artery disease Mother     Stroke Mother         TIA    Pulmonary embolism Father         Thought to be cause of death 5/2011    Hypertension Father     Hypertension Brother     No Known Problems Brother     Hypertension Maternal Grandmother     Hypertension Maternal Grandfather     Hypertension Paternal Grandmother     Hypertension Paternal Grandfather     Colon cancer Neg Hx     Breast cancer Neg Hx     Ovarian cancer Neg Hx      "Endometrial cancer Neg Hx     Osteoporosis Neg Hx        Social History:   Social History     Socioeconomic History    Marital status:    Tobacco Use    Smoking status: Never     Passive exposure: Never    Smokeless tobacco: Never    Tobacco comments:     Both parents did smoke   Vaping Use    Vaping status: Never Used   Substance and Sexual Activity    Alcohol use: Yes     Comment: 1-3 drinks per week    Drug use: Never    Sexual activity: Yes     Partners: Male     Birth control/protection: Condom, Natural family planning/Rhythm         Objective     Vital Signs  /80 (BP Location: Right arm, Patient Position: Sitting, Cuff Size: Adult)   Pulse 78   Temp 97.5 °F (36.4 °C) (Temporal)   Ht 166.4 cm (65.5\")   Wt 70.5 kg (155 lb 6.4 oz)   BMI 25.47 kg/m²   Estimated body mass index is 25.47 kg/m² as calculated from the following:    Height as of this encounter: 166.4 cm (65.5\").    Weight as of this encounter: 70.5 kg (155 lb 6.4 oz).          Physical Exam  Vitals reviewed.   Constitutional:       General: She is awake.   Cardiovascular:      Rate and Rhythm: Normal rate.   Pulmonary:      Effort: Pulmonary effort is normal.   Abdominal:      Palpations: Abdomen is soft.      Tenderness: There is no abdominal tenderness.   Skin:     General: Skin is warm and dry.   Neurological:      Mental Status: She is alert.        Physical Exam      Results  Imaging  CT scan of liver showed small lesions thought to be cysts.      Assessment and Plan     Assessment & Plan  Liver lesion         Family history of colon cancer  Colon cancer in brother diagnosed 2024 in his 50's. Multiple other family members with colon cancer.           Assessment & Plan  1. Abdominal pain.  The abdominal pain may be related to stress or dietary factors. There is no history of heartburn, reflux, or use of PPI medications. She has not had a colonoscopy or EGD before. Given her family history of colon cancer, a colonoscopy is " recommended around age 41 or sooner. The potential risks and benefits of colonoscopy were discussed. She was advised to consider an upper endoscopy concurrently with the colonoscopy to check for active celiac disease. She would like to defer endoscopy for now, but will reach out when she is ready.     2. Suspected celiac disease.  She has responded well to a gluten-free diet, which has alleviated her right upper quadrant pain. An EGD for a confirmation biopsy has not been performed. The potential benefits of an upper endoscopy to check for active celiac disease were discussed. She was advised to continue her gluten-free diet and consider the upper endoscopy if her symptoms persist or worsen.    3. Hepatic cysts.  Previous imaging showed small hepatic cysts. Liver enzymes have always remained normal despite these lesions, aside from mild elevation of ALP during pregnancy. An MRI will be ordered to better characterize these lesions. She will be contacted to schedule the MRI. Further recommendations pending results.       Follow Up  As needed. Return by age 41 for colonoscopy.     Patient or patient representative verbalized consent for the use of Ambient Listening during the visit with  SHAVONNE Grant for chart documentation. 3/19/2025  09:09 EDT    SHAVONNE Grant  [facility]  [facility] GASTROENTEROLOG[facility]  [address] [place] [id number]

## 2025-03-25 NOTE — PROGRESS NOTES
Subjective   Chief Complaint   Patient presents with    Annual Exam     Well woman exam     Geeta Negrete is a 39 y.o. year old  presenting to be seen for her annual exam.   Her last Pap was 2024 with normal cytology and negative HPV cotesting.     SEXUAL Hx:  She is currently sexually active.  In the past year there there has been NO new sexual partners.    Condoms are never used.  She would not like to be screened for STD's at today's exam.  Current birth control method:  natural family planning .  She is happy with her current method of contraception and does not want to discuss alternative methods of contraception.  MENSTRUAL Hx:  Patient's last menstrual period was 2025 (exact date).  In the past 6 months her cycles have been regular, predictable and occur monthly.  Her menstrual flow is typically normal.   Each month on average there are roughly 0 day(s) of very heavy flow.    Intermenstrual bleeding is absent.    Post-coital bleeding is absent.  Dysmenorrhea: is not affecting her activities of daily living  PMS: moderate and worse the week before her cycles, increased over the last few years   Her cycles are not a source of concern for her that she wishes to discuss today.  HEALTH Hx:  She exercises regularly: no (and has no plans to become more active).  She wears her seat belt: yes.  She has concerns about domestic violence: no.  OTHER THINGS SHE WANTS TO DISCUSS TODAY:  Nothing else    The following portions of the patient's history were reviewed and updated as appropriate:problem list, current medications, allergies, past family history, past medical history, past social history, and past surgical history.    Social History    Tobacco Use      Smoking status: Never        Passive exposure: Never      Smokeless tobacco: Never      Tobacco comments: Both parents did smoke    Review of Systems  Constitutional POS: nothing reported    NEG: anorexia or night sweats  "  Genitourinary POS: urgency    NEG: dysuria or hematuria      Gastointestinal POS: nothing reported    NEG: bloating, change in bowel habits, melena, or reflux symptoms   Integument POS: nothing reported    NEG: moles that are changing in size, shape, color or rashes   Breast POS: nothing reported    NEG: persistent breast lump, skin dimpling, or nipple discharge        Objective   /80 (BP Location: Left arm, Patient Position: Sitting, Cuff Size: Adult)   Ht 166.4 cm (65.5\")   Wt 70.2 kg (154 lb 12.8 oz)   LMP 02/25/2025 (Exact Date)   BMI 25.37 kg/m²     General:  well developed; well nourished  no acute distress  mentation appropriate   Skin:  No suspicious lesions seen   Thyroid: normal to inspection and palpation   Breasts:  Examined in supine position  Symmetric without masses or skin dimpling  Nipples normal without inversion, lesions or discharge  There are no palpable axillary nodes   Abdomen: soft, non-tender; no masses  no umbilical or inguinal hernias are present  no hepato-splenomegaly   Pelvis: Clinical staff was present for exam  :  urethral meatus normal; urethra normal:  Vaginal:  normal pink mucosa without prolapse or lesions.  Cervix:  normal appearance.  Uterus:  normal size, shape and consistency.  Adnexa:  normal bimanual exam of the adnexa.  Rectal:  digital rectal exam not performed; anus visually normal appearing.        Assessment   Well woman with routine gynecological exam.   Urinary urgency     Plan     Pap was not done today.  I explained to Geeta that the recommendations for Pap smear interval in a low risk patient has lengthened to 3 years time.  I told Geeta she still needs to be seen in our office yearly for a full physical including breast and pelvic exam.  Discussed pelvic floor physical therapy- she would like to go to the same provider as her friend, will send my chart message with therapist's name and location then referral will be sent   The importance of " keeping all planned follow-up and taking all medications as prescribed was emphasized.  Today I discussed with Geeta the total recommended calcium intake for a premenopausal female is 1000 mg.  Ideally this should be from dietary sources.  I reviewed calcium content in various foods including milk, fortified orange juice and yogurt.  If she cannot get sufficient calcium through dietary means, it is recommended to supplement with either a multivitamin or calcium to reach her daily goal.  I also reviewed the difference in the bioavailability of calcium carbonate and calcium citrate containing supplements and the importance of taking calcium carbonate containing products with food.  Finally, vitamin D's role in calcium absorption was reviewed and a total daily vitamin D intake of 800 units was recommended.  Discussed pms symptoms, discussed options for hormonal treatment, she would like to avoid hormones if she can, will monitor symptoms and update provider if symptoms become increasingly problematic  Follow up for annual exam 1 year    No orders of the defined types were placed in this encounter.         This note was electronically signed.    Liseth Garvin, SHAVONNE  March 26, 2025

## 2025-03-26 ENCOUNTER — OFFICE VISIT (OUTPATIENT)
Dept: OBSTETRICS AND GYNECOLOGY | Facility: CLINIC | Age: 40
End: 2025-03-26
Payer: COMMERCIAL

## 2025-03-26 VITALS
WEIGHT: 154.8 LBS | HEIGHT: 66 IN | SYSTOLIC BLOOD PRESSURE: 126 MMHG | BODY MASS INDEX: 24.88 KG/M2 | DIASTOLIC BLOOD PRESSURE: 80 MMHG

## 2025-03-26 DIAGNOSIS — R35.0 URINARY FREQUENCY: ICD-10-CM

## 2025-03-26 DIAGNOSIS — Z01.419 WELL WOMAN EXAM: Primary | ICD-10-CM

## 2025-03-26 DIAGNOSIS — N94.3 PMS (PREMENSTRUAL SYNDROME): ICD-10-CM

## 2025-03-27 DIAGNOSIS — R39.15 URINARY URGENCY: Primary | ICD-10-CM

## 2025-04-29 ENCOUNTER — HOSPITAL ENCOUNTER (OUTPATIENT)
Dept: MRI IMAGING | Facility: HOSPITAL | Age: 40
Discharge: HOME | End: 2025-04-29
Payer: COMMERCIAL

## 2025-04-29 DIAGNOSIS — K76.9 LIVER LESION: ICD-10-CM

## 2025-04-29 PROCEDURE — A9577 INJ MULTIHANCE: HCPCS

## 2025-04-29 PROCEDURE — 25510000002 GADOBENATE DIMEGLUMINE 529 MG/ML SOLUTION

## 2025-04-29 PROCEDURE — 74183 MRI ABD W/O CNTR FLWD CNTR: CPT

## 2025-04-29 RX ADMIN — GADOBENATE DIMEGLUMINE 14 ML: 529 INJECTION, SOLUTION INTRAVENOUS at 10:00

## 2025-05-02 ENCOUNTER — RESULTS FOLLOW-UP (OUTPATIENT)
Dept: GASTROENTEROLOGY | Facility: CLINIC | Age: 40
End: 2025-05-02
Payer: COMMERCIAL

## 2025-05-02 DIAGNOSIS — R10.84 GENERALIZED ABDOMINAL PAIN: Primary | ICD-10-CM

## 2025-06-16 NOTE — PROGRESS NOTES
"[facility]  Follow Up Visit  Geeta Negrete  1985    VISIT DATE:  06/17/25    PCP:   Bel Farley APRN [address] [address]          CC:  Palpitations (Pt denies current symptoms. )      Problem List:   Family history CAD  Mother with CABG,  PAD,  TIA     Echo March 2024:  EF 60 to 65%  No significant valve abnormalities       History of Present Illness:  Geeta Negrete  Is a 39 y.o. female with pertinent cardiac history detailed above.  She is currently feeling well.  No new cardiac symptoms or concerns.  There has been some increase in her LDL compared to last year.  Now 117.  She wants to try and focus on dietary and lifestyle measures at this time to help lower.  She did try to pursue calcium score last year but her heart rate was too elevated and had to be postponed.  We discussed using a beta-blocker preprocedure.  She is agreeable to retrying/rescheduling.      Patient Active Problem List    Diagnosis Date Noted    Annual GYN exam 12/04/2022     Note Last Updated: 12/12/2022     SCREENING TESTS    Year 2018 2019 2020 2021 2022 2023 2024 2025 2026 2027 2028 2029 2030 2031 2032 2033 2034 2035 2036   Age                      PAP - - - - 12                 HPV high risk                      HANSEL [Birads]                      DEBBIE (5 year)  Jovanna Hirsch (lifetime)                      Colonoscopy                      DEXA [T-score]  Frax [hip/any]                      Lipids  [LDL / HDL / TG]                      Vitamin D                      TSH                        Enter the month test was performed.  If month not known, enter \"X'  Black numbers = normal results  Red numbers = abnormal results  Black X = patient reported normal  Red X - patient reported abnormal      Referred by:    Profession: [employer]   Other info: [place] patient            No Known Allergies    Social History     Socioeconomic History    Marital status:  "    Number of children: 1    Highest education level: Bachelor's degree (e.g., BA, AB, BS)   Tobacco Use    Smoking status: Never     Passive exposure: Never    Smokeless tobacco: Never    Tobacco comments:     Both parents did smoke   Vaping Use    Vaping status: Never Used   Substance and Sexual Activity    Alcohol use: Yes     Comment: 1-3 drinks per week    Drug use: Never    Sexual activity: Yes     Partners: Male     Birth control/protection: Condom, Natural family planning/Rhythm       Family History   Problem Relation Age of Onset    Pulmonary embolism Father         Thought to be cause of death 5/2011    Hypertension Father         PE 5/2011    Hypertension Mother         Quadruple Bypass 2009, PAD sx 2011&2018, subclavian bypass 8/2021, carotid endarterectomy 9/2021, passed 7/2022 (thought to be due to heart disease/coronary artery disease)    Coronary artery disease Mother     Stroke Mother         TIA    Heart attack Mother         Had quadruple bypass at 55, multiple angioplasty and stents, other bypass and procedures done after(see below)    Heart disease Mother     Colon cancer Brother     Hypertension Brother     Arrhythmia Brother         Couple episodes with AFib    No Known Problems Brother     Hypertension Paternal Grandfather     Hypertension Paternal Grandmother     Hypertension Maternal Grandmother     Hypertension Maternal Grandfather     Breast cancer Neg Hx     Ovarian cancer Neg Hx     Endometrial cancer Neg Hx     Osteoporosis Neg Hx        Current Medications:    Current Outpatient Medications:     cetirizine (ZyrTEC Allergy) 10 MG tablet, Take 1 tablet by mouth Daily., Disp: , Rfl:     Omega-3 Fatty Acids (fish oil) 1000 MG capsule capsule, Take  by mouth., Disp: , Rfl:     Prenatal Vit-Fe Fumarate-FA (PRENATAL, CLASSIC, VITAMIN) 28-0.8 MG tablet tablet, Take  by mouth Daily., Disp: , Rfl:     Vitamin D, Cholecalciferol, (CHOLECALCIFEROL) 10 MCG (400 UNIT) tablet, Take 1 tablet by mouth  "Daily., Disp: , Rfl:     metoprolol tartrate (LOPRESSOR) 25 MG tablet, Take 1 tablet by mouth Take As Directed. Take one evening prior to and morning of calcium score, Disp: 5 tablet, Rfl: 0     Review of Systems   Cardiovascular: Negative.    Respiratory: Negative.         Vitals:    06/17/25 0943   BP: 116/80   BP Location: Right arm   Patient Position: Sitting   Cuff Size: Adult   Pulse: 82   SpO2: 100%   Weight: 72 kg (158 lb 12.8 oz)   Height: 167.6 cm (66\")       Physical Exam  Constitutional:       Appearance: Normal appearance.   Cardiovascular:      Rate and Rhythm: Normal rate and regular rhythm.      Pulses: Normal pulses.      Heart sounds: Normal heart sounds.   Pulmonary:      Effort: Pulmonary effort is normal.      Breath sounds: Normal breath sounds.   Musculoskeletal:      Right lower leg: No edema.      Left lower leg: No edema.   Neurological:      General: No focal deficit present.      Mental Status: She is alert.         Diagnostic Data:    ECG 12 Lead    Date/Time: 6/17/2025 9:59 AM  Performed by: Madhu Maldonado MD    Authorized by: Madhu Maldonado MD  Comparison: compared with previous ECG from 4/16/2024  Similar to previous ECG  Rhythm: sinus rhythm  Rate: normal  BPM: 82  QRS axis: normal    Clinical impression: normal ECG        Lab Results   Component Value Date    TRIG 30 03/13/2024    HDL 72 (H) 03/13/2024     Lab Results   Component Value Date    GLUCOSE 82 03/13/2024    BUN 11 03/13/2024    CREATININE 0.71 03/13/2024     03/13/2024    K 4.3 03/13/2024     03/13/2024    CO2 24.7 03/13/2024     Lab Results   Component Value Date    HGBA1C 5.00 02/14/2024     Lab Results   Component Value Date    WBC 4.83 03/13/2024    HGB 14.0 03/13/2024    HCT 42.2 03/13/2024     03/13/2024       Assessment:   Diagnosis Plan   1. Family history of early CAD  ECG 12 Lead    CT Cardiac Calcium Score Without Dye          Plan:    Palpitations  Echo 60 to 65% EF, no " valve abnormality  Holter monitor 2024: Normal study with no significant ectopy or arrhythmias  We will continue to monitor at this time  Family history of CAD  Total cholesterol 2 2, triglycerides 52, HDL 73,  lipoprotein a 83.6 (normal less than 75)  Will plan on obtaining calcium score.  Given metoprolol 25 mg to take preprocedure  Further recommendations on the need for statin therapy after review of calcium score         ACP discussion was held with the patient during this visit. Patient does not have an advance directive, declines further assistance.      Madhu Maldonado MD [facility]

## 2025-06-17 ENCOUNTER — OFFICE VISIT (OUTPATIENT)
Dept: CARDIOLOGY | Facility: CLINIC | Age: 40
End: 2025-06-17
Payer: COMMERCIAL

## 2025-06-17 VITALS
OXYGEN SATURATION: 100 % | HEART RATE: 82 BPM | HEIGHT: 66 IN | WEIGHT: 158.8 LBS | BODY MASS INDEX: 25.52 KG/M2 | DIASTOLIC BLOOD PRESSURE: 80 MMHG | SYSTOLIC BLOOD PRESSURE: 116 MMHG

## 2025-06-17 DIAGNOSIS — Z82.49 FAMILY HISTORY OF EARLY CAD: Primary | ICD-10-CM

## 2025-06-17 PROCEDURE — 93000 ELECTROCARDIOGRAM COMPLETE: CPT | Performed by: INTERNAL MEDICINE

## 2025-06-17 PROCEDURE — 99214 OFFICE O/P EST MOD 30 MIN: CPT | Performed by: INTERNAL MEDICINE

## 2025-06-17 RX ORDER — CETIRIZINE HYDROCHLORIDE 10 MG/1
10 TABLET ORAL DAILY
COMMUNITY

## 2025-06-17 RX ORDER — METOPROLOL TARTRATE 25 MG/1
25 TABLET, FILM COATED ORAL TAKE AS DIRECTED
Qty: 5 TABLET | Refills: 0 | Status: SHIPPED | OUTPATIENT
Start: 2025-06-17

## 2025-06-30 ENCOUNTER — HOSPITAL ENCOUNTER (OUTPATIENT)
Dept: PHYSICAL THERAPY | Facility: HOSPITAL | Age: 40
Setting detail: THERAPIES SERIES
Discharge: HOME | End: 2025-06-30
Payer: COMMERCIAL

## 2025-06-30 DIAGNOSIS — N81.89 PELVIC FLOOR WEAKNESS: ICD-10-CM

## 2025-06-30 DIAGNOSIS — M62.89 PELVIC FLOOR DYSFUNCTION: Primary | ICD-10-CM

## 2025-06-30 DIAGNOSIS — R39.15 URINARY URGENCY: ICD-10-CM

## 2025-06-30 DIAGNOSIS — M62.08 DIASTASIS RECTI: ICD-10-CM

## 2025-06-30 PROCEDURE — 97162 PT EVAL MOD COMPLEX 30 MIN: CPT

## 2025-06-30 PROCEDURE — 97112 NEUROMUSCULAR REEDUCATION: CPT

## 2025-06-30 NOTE — THERAPY EVALUATION
Physical Therapy Pelvic Initial Evaluation     Patient: Geeta Negrete   : 1985  Diagnosis/ICD-10 Code:      ICD-10-CM ICD-9-CM   1. Pelvic floor dysfunction  M62.89 618.83   2. Pelvic floor weakness  N81.89 618.89   3. Diastasis recti  M62.08 728.84   4. Urinary urgency  R39.15 788.63     Referring practitioner: Liseth Garvin CNM  Date of Initial Visit: 2025  Today's Date: 2025      Subjective Evaluation    History of Present Illness  Mechanism of injury: Holds urine by sitting down, symptoms have been there since before daughter and gradually getting worse, week prior to period increased urgency and constipation  History of cysts on liver          Chief Complaint:   Chief Complaint   Patient presents with    Initial Evaluation      Functional Outcome Measure:  score PFDI-20    Pain  Location: dull burning under R rib pain no pattern, started during pregnancy  Aggs: no pattern  Abdominal or pelvic pressure: sometimes ovulation week  Lumbar/hip pain: no    Sexual Dysfunction  Pain with initial penetration: no  Pain with deep penetration: no  Pressure/pain after sex: no    Bladder function:  Frequency of urination: 12  Complete bladder voiding %: sometimes incomplete emptying,  Incontinence: with urge/rushing, no stress incontinence  Urination at night: no  Leak at night: no  Mild/Moderate/Strong urinary urge: strong  Urge triggers: coffee  Post-micturition dribble: no  Discomfort with urination: no    Bowel function:  How many BM's/day: 1 day  Complete bowel voiding %: yes  Discomfort with BM: no  Mild/Moderate/Strong fecal urgency: no  Assistance to pass stool: no  How many episodes of leakage/month: no  Incontinence with gas: no  Water/Fiber: 50-80 oz  [place] Stool Scale Type: 3-4 normally and sometimes 1-2 when constipated      Diagnostics:    PMH:   Past Medical History:   Diagnosis Date    Gestational hypertension After delivery    Took BP meds short period of time after  delivery    Ovarian cyst     Recurrent pregnancy loss, antepartum condition or complication     One at around 8-9 weeks and another earlier on after doing blood tests(saw Dr. Carson)    Rh incompatibility     Possibly/Unknown on this answer- baby and I had different blood types and daughter was tested/checked for jaundice and had bilirubin levels    Varicella     As a child        Surgical HX:   Past Surgical History:   Procedure Laterality Date    WISDOM TOOTH EXTRACTION  2008        Cycle History: regular    Birth HX 3 pregnancies, 1 birth vaginal birth grade 1-2 (stitches)    Occupation/hobbies: homeschool    Objective          Active Range of Motion   Cervical/Thoracic Spine     Thoracic   Left rotation: WFL  Right rotation: WFL    Additional Active Range of Motion Details  Decreased L thoracic rotation compared to R, measured in seated  R ribs more posteriorly prominent     General Comments     Lumbar Comments  2 finger diastasis recti 1.5 inches above through 1 inch below umbilicus, decreased transverse abdominus activation     06/30/25 1300   Total Minutes   15594 -  PT Neuromuscular Reeducation Minutes 10   Exercise 1   Exercise Name 1 PF relaxation breathing in supine and prone   Additional Comments see chart   Exercise 2   Exercise Name 2 urge delay training- see chart       Verbal consent obtained for internal pelvic exam/treatment with declined need for second person in room    Palpation: negative to tenderness throughout    PF Muscle Coordination/Pressure Management: no visible prolapse, downward movement with coughing, little movement with head lifting      PERF SCALE:  Power: 3/5    Endurance: 6 seconds    Repetitions: 5    Fast twitch: 5    See flowsheet for details of treatment following evaluation.    Patient was educated on diaphragm/pelvic floor/core system, ed on PF dysfunction as it pertains to the nervous system, ed on function of PF muscles, ed on toileting position, adequate water intake,  ed on POC and goals to improve PF function and QOL     Assessment/Plan:     Assessment/Plan    The patient is a 39 y.o. female who presents to physical therapy today with evolving symptoms of a moderate severity regarding these problems increased urinary frequency. Upon initial evaluation, the patient demonstrates the following impairments: lack of HEP, lack of pelvic floor education, decreased pelvic floor strength and coordination, 2 finger width diastasis recti, and decreased L thoracic AROM rotation. Due to these impairments, the patient is unable to hold urine longer then one hour, adequately contract her deep core muscles, and lift the pelvic floor muscles during a cough . The patient would benefit from skilled pelvic PT services to address impairments and achieve highest level of functioning in order to improve quality of life, activity level, and community participation.      Goals:     STG's: 4 visits  1. Patient will report day time urination less than/equal to 10 x, for improved QOL   2. Patient will be able to perform HEP with minimal cues, for improved compliance and optimal outcomes.  3. Patient will report 50% decreased urinary urgency, to improve community and activity participation  4. Patient will report less than/equal to 1 leakage episodes per week, to improve QOL and activity participation.  5. Patient will score equal to/less than 12/80 on PFDI-20 to improve QOL and community participation  6. Patient will demo adequate TA activation in supine exercise to improve core strength and PF muscle support.    LTG's: 8 visits  1. Patient will be independent with HEP for improved compliance and optimal outcomes.  2. Patient will report 100% decreased urinary urgency, to  improve community and activity participation  3. Patient will report no leakage episodes per week, to improve QOL and activity participation.  4. Patient will score equal to/less than 0/80 on PFDI-20 to improve QOL and community  participation   5. Patient will demo adequate TA activation in supine exercise to improve core strength and PF muscle support.  6. Patient will demo 5/5 pelvic floor muscle strength to decrease risk of urinary incontinence in the future    Plan  Therapy options: will be seen for skilled physical therapy services  Planned modality interventions: TENS, ultrasound, cryotherapy, thermotherapy (hydrocollator packs) and high voltage pulsed current (pain management)  Planned therapy interventions: abdominal trunk stabilization, manual therapy, neuromuscular re-education, body mechanics training, flexibility, functional ROM exercises, gait training, home exercise program, joint mobilization, therapeutic activities, stretching, strengthening, spinal/joint mobilization, soft tissue mobilization and postural training. CPT codes to include: 42929, 12799, 43578, 40481, 22249, and 50096.  Frequency: 1 x per week  Visits:  4  Treatment plan discussed with: patient    Therapy Charges for Today       Code Description Service Date Service Provider Modifiers Qty    09025637918 HC PT NEUROMUSC RE EDUCATION EA 15 MIN 6/30/2025 Connie Dhillon, PT GP 1    [id number] HC-PT EVAL MOD COMPLEXITY 5 6/30/2025 Connie Dhillon, PT  1             PT SIGNATURE: Connie Dhillon PT, DPT  [place] License # [id number]    DATE TREATMENT INITIATED: 6/30/2025    Initial Certification  Certification Period: 9/28/2025  I certify that the therapy services are furnished while this patient is under my care.  The services outlined above are required by this patient, and will be reviewed every 90 days.     PHYSICIAN: Liseth Garvin CNM      DATE: 06/30/2025     Please sign and return via fax to [phone]. Thank you, [facility].

## 2025-07-07 ENCOUNTER — HOSPITAL ENCOUNTER (OUTPATIENT)
Dept: PHYSICAL THERAPY | Facility: HOSPITAL | Age: 40
Setting detail: THERAPIES SERIES
Discharge: HOME | End: 2025-07-07
Payer: COMMERCIAL

## 2025-07-07 DIAGNOSIS — M62.89 PELVIC FLOOR DYSFUNCTION: Primary | ICD-10-CM

## 2025-07-07 DIAGNOSIS — N81.89 PELVIC FLOOR WEAKNESS: ICD-10-CM

## 2025-07-07 DIAGNOSIS — R39.15 URINARY URGENCY: ICD-10-CM

## 2025-07-07 DIAGNOSIS — M62.08 DIASTASIS RECTI: ICD-10-CM

## 2025-07-07 PROCEDURE — 97112 NEUROMUSCULAR REEDUCATION: CPT

## 2025-07-07 NOTE — THERAPY TREATMENT NOTE
Physical Therapy Daily Note      Patient: Geeta Negrete   : 1985  MRN: [id number]   Diagnosis/ICD-10 Code:      ICD-10-CM ICD-9-CM   1. Pelvic floor dysfunction  M62.89 618.83   2. Pelvic floor weakness  N81.89 618.89   3. Diastasis recti  M62.08 728.84   4. Urinary urgency  R39.15 788.63      Referring practitioner: Liseth Garvin CNM  Today's Date: 2025    Subjective:   Patient reports: Pt able to go longer than 1 hour between bathroom trips, no urinary incontinence over past week, no R rib pain over past week  Pain: 0  Treatment has included: neuromuscular re-education      Subjective      Objective          Strength/Myotome Testing     Left Hip   Planes of Motion   Flexion: 5  Abduction: 4  Adduction: 4+    Right Hip   Planes of Motion   Flexion: 5  Abduction: 4+  Adduction: 5    Left Knee   Flexion: 4  Extension: 5    Right Knee   Flexion: 4+  Extension: 5    Left Ankle/Foot   Dorsiflexion: 5  Plantar flexion: 5    Right Ankle/Foot   Dorsiflexion: 5  Plantar flexion: 5      See Exercise, Manual, and Modality Logs for complete treatment.       25 1100   Total Minutes   76008 -  PT Neuromuscular Reeducation Minutes 42   Exercise 1   Exercise Name 1 Prone 360 breathing followed by PF contractions   Cueing 1 Verbal   Reps 1 15   Additional Comments cues for vaginal lift, HEP see chart   Exercise 2   Exercise Name 2 seated PF contractions over towel roll   Cueing 2 Verbal;Demo   Reps 2 15   Additional Comments HEP see chart   Exercise 3   Exercise Name 3 Supine TA contractions + LE march (extra effort for pelvic stability on R)   Cueing 3 Verbal;Tactile;Demo   Reps 3 10 LE marches   Additional Comments difficulty with RLE march cues for full exhale, HEP see chart   Exercise 4   Exercise Name 4 review of urge delay training, option of using PF contraction as strategy vs deep breathing   Cueing 4 Verbal   Time 4 10  (inc time for subjective)   Exercise 5   Exercise Name 5 TA  contractions in quadruped + hip adduction on ball + PF contraction   Cueing 5 Verbal;Tactile;Demo   Reps 5 15   Additional Comments cues vaginal lift on exhale, HEP see chart       PT Pelvic     Balance Skills Training  54553 -  PT Neuromuscular Reeducation Minutes: 42    Assessment & Plan       Assessment  Assessment details: Session focused on initiation of core and PF strengthening program. Pt to trial urge delay with PF contractions as able. She required cues for timing PF contraction with exhale and for core stability when lifting RLE. Patient would continue to benefit from skilled PT services to achieve highest level of functioning.      Plan  Plan details: Patient to continue with PT services to improve PF coordination, PF strength, core strength, decrease pain and improve bowel/bladder function.            Therapy Charges for Today       Code Description Service Date Service Provider Modifiers Qty    92050400625 HC PT NEUROMUSC RE EDUCATION EA 15 MIN 7/7/2025 Connie Dhillon, PT GP 3          PT SIGNATURE: Connie Dhillon PT, DPT  [place] License # [id number]  Physical Therapist

## 2025-07-14 ENCOUNTER — APPOINTMENT (OUTPATIENT)
Dept: PHYSICAL THERAPY | Facility: HOSPITAL | Age: 40
End: 2025-07-14
Payer: COMMERCIAL

## 2025-07-16 ENCOUNTER — TELEPHONE (OUTPATIENT)
Dept: CARDIOLOGY | Facility: CLINIC | Age: 40
End: 2025-07-16
Payer: COMMERCIAL

## 2025-07-16 DIAGNOSIS — R91.1 LUNG NODULE SEEN ON IMAGING STUDY: Primary | ICD-10-CM

## 2025-07-21 ENCOUNTER — HOSPITAL ENCOUNTER (OUTPATIENT)
Dept: PHYSICAL THERAPY | Facility: HOSPITAL | Age: 40
Setting detail: THERAPIES SERIES
Discharge: HOME | End: 2025-07-21
Payer: COMMERCIAL

## 2025-07-21 DIAGNOSIS — M62.08 DIASTASIS RECTI: ICD-10-CM

## 2025-07-21 DIAGNOSIS — N81.89 PELVIC FLOOR WEAKNESS: ICD-10-CM

## 2025-07-21 DIAGNOSIS — R39.15 URINARY URGENCY: ICD-10-CM

## 2025-07-21 DIAGNOSIS — M62.89 PELVIC FLOOR DYSFUNCTION: Primary | ICD-10-CM

## 2025-07-21 PROCEDURE — 97112 NEUROMUSCULAR REEDUCATION: CPT

## 2025-07-21 NOTE — THERAPY TREATMENT NOTE
Physical Therapy Daily Note      Patient: Geeta Negrete   : 1985  MRN: [id number]   Diagnosis/ICD-10 Code:      ICD-10-CM ICD-9-CM   1. Pelvic floor dysfunction  M62.89 618.83   2. Pelvic floor weakness  N81.89 618.89   3. Diastasis recti  M62.08 728.84   4. Urinary urgency  R39.15 788.63      Referring practitioner: Liseth Garvin CNM  Today's Date: 2025    Subjective:   Patient reports: urgency has been better, air conditioning went out last week but is fixed now, has been able to do some exercise, feels like deep breathing is working best for urge control  Pain: 0  Treatment has included: neuromuscular re-education      Subjective      Objective   See Exercise, Manual, and Modality Logs for complete treatment.     25 1100   Total Minutes   21182 -  PT Neuromuscular Reeducation Minutes 38   Exercise 1   Exercise Name 1 Prone 360 breathing followed by PF contractions   Reps 1 10   Additional Comments discontinued HEP only to use for warm up if needed   Exercise 2   Exercise Name 2 seated PF contractions over towel roll moving to STS   Cueing 2 Verbal;Demo   Reps 2 5, 10   Additional Comments HEP   Exercise 3   Exercise Name 3 Supine TA contractions + LE march-cues for full exhale vs upper abdominal gripping   Cueing 3 Verbal;Tactile   Reps 3 8 LE marches   Additional Comments HEP   Exercise 4   Exercise Name 4 bridge + adduction + PF contractions   Cueing 4 Verbal;Tactile   Reps 4 15   Additional Comments HEP   Exercise 5   Exercise Name 5 TA contractions in quadruped + hip adduction on ball + PF contraction   Cueing 5 Verbal;Tactile   Reps 5 12   Additional Comments HEP, cues timing adduction       PT Pelvic     Balance Skills Training  74690 -  PT Neuromuscular Reeducation Minutes: 38    Assessment & Plan       Assessment  Assessment details: Session focused on adding PF contractions to core program and activating LE co-contractors. Pt requires cues for timing exhale and PF lift  vs pushing out during exercise. Patient would continue to benefit from skilled PT services to achieve highest level of functioning.      Plan  Plan details: Patient to continue with PT services to improve PF coordination, PF strength, core strength, decrease pain and improve bowel/bladder function.            Therapy Charges for Today       Code Description Service Date Service Provider Modifiers Qty    42435456085 HC PT NEUROMUSC RE EDUCATION EA 15 MIN 7/21/2025 Connie Dhillon, PT GP 3    [id number] HC PT NEUROMUSC RE EDUCATION EA 15 MIN 7/21/2025 Connie Dhillon, PT GP 3          PT SIGNATURE: Connie Dhillon PT, DPT  [place] License # [id number]  Physical Therapist

## 2025-07-28 ENCOUNTER — HOSPITAL ENCOUNTER (OUTPATIENT)
Dept: PHYSICAL THERAPY | Facility: HOSPITAL | Age: 40
Setting detail: THERAPIES SERIES
Discharge: HOME | End: 2025-07-28
Payer: COMMERCIAL

## 2025-07-28 DIAGNOSIS — N81.89 PELVIC FLOOR WEAKNESS: ICD-10-CM

## 2025-07-28 DIAGNOSIS — M62.89 PELVIC FLOOR DYSFUNCTION: Primary | ICD-10-CM

## 2025-07-28 DIAGNOSIS — R39.15 URINARY URGENCY: ICD-10-CM

## 2025-07-28 DIAGNOSIS — M62.08 DIASTASIS RECTI: ICD-10-CM

## 2025-07-28 PROCEDURE — 97112 NEUROMUSCULAR REEDUCATION: CPT

## 2025-07-28 PROCEDURE — 97530 THERAPEUTIC ACTIVITIES: CPT

## 2025-07-28 NOTE — THERAPY PROGRESS REPORT/RE-CERT
PT Progress Note  [facility]  [address]    Patient: Geeta Negrete   : 1985  Diagnosis/ICD-10 Code:      ICD-10-CM ICD-9-CM   1. Pelvic floor dysfunction  M62.89 618.83   2. Pelvic floor weakness  N81.89 618.89   3. Diastasis recti  M62.08 728.84   4. Urinary urgency  R39.15 788.63      MRN: [id number]    Referring practitioner: Liseth Garvin CNM  Today's Date: 2025    Subjective:   Patient reports: urgency has been good w/only 1 episode, Pt able to wait two hours without urinary urgency, using PF contractions and deep breathing to delay urgency, exercises are going well  Pain: 0  Clinical Progress: improved  Home Program Compliance: Yes  Treatment has included: neuromuscular re-education and therapeutic activity    Subjective    Objective   See Exercise, Manual, and Modality Logs for complete treatment.     25 1200   Total Minutes   84078 -  PT Neuromuscular Reeducation Minutes 28   28897 - PT Therapeutic Activity Minutes 10   Exercise 1   Exercise Name 1 progress note/goals   Time 1 10 mins   Exercise 2   Exercise Name 2 progressed seated/STS combo to warm up in seated, 3 STS PF contractions, then standing heel raise PF contractions   Cueing 2 Verbal;Demo   Reps 2 5,5, 10   Additional Comments HEP   Exercise 3   Exercise Name 3 supine 90/90 breathing w/out LE support   Cueing 3 Verbal;Tactile   Reps 3 5 breaths   Additional Comments HEP   Exercise 4   Exercise Name 4 bridge + adduction + PF contractions + SA reach + toe lift   Cueing 4 Verbal;Tactile   Reps 4 10   Additional Comments HEP   Exercise 5   Exercise Name 5 TA contractions in quadruped + hip adduction on ball + PF contraction + arm raise   Cueing 5 Verbal;Tactile   Reps 5 5   Additional Comments HEP       PT Pelvic    Transfers  [id number] - PT Therapeutic Activity Minutes: 10  Balance Skills Training  09219 -  PT Neuromuscular Reeducation Minutes: 28    Assessment & Plan        Assessment  Assessment details: Pt met 6 goals this date and is making good progress toward abdominal strength and urinary urgency goals. She continues to require cueing for exercise sequencing and timing PF contractions with exhale. She requires 4 additional visits to achieve highest level of physical functioning and meet remainder of goals.    Goals  Plan Goals: STG's: 4 visits  1. Patient will report day time urination less than/equal to 10 x, for improved QOL. MET  2. Patient will be able to perform HEP with minimal cues, for improved compliance and optimal outcomes. MET  3. Patient will report 50% decreased urinary urgency, to improve community and activity participation. MET  4. Patient will report less than/equal to 1 leakage episodes per week, to improve QOL and activity participation. MET  5. Patient will score equal to/less than 12/80 on PFDI-20 to improve QOL and community participation. ONGOING  6. Patient will demo adequate TA activation in supine exercise to improve core strength and PF muscle support. MET     LTG's: 8 visits  1. Patient will be independent with HEP for improved compliance and optimal outcomes. ONGOING  2. Patient will report 100% decreased urinary urgency, to  improve community and activity participation ONGOING  3. Patient will report no leakage episodes per week, to improve QOL and activity participation. MET  4. Patient will score equal to/less than 0/80 on PFDI-20 to improve QOL and community participation ONGOING  5. Patient will demo equal to or less than 1 finger abdominal separation to improve core strength and PF muscle support. REVISED  6. Patient will demo 5/5 pelvic floor muscle strength to decrease risk of urinary incontinence in the future ONGOING      Plan  Plan details: Therapy options: will be seen for skilled physical therapy services  Planned modality interventions: TENS, ultrasound, cryotherapy, thermotherapy (hydrocollator packs) and high voltage pulsed  current (pain management)  Planned therapy interventions: abdominal trunk stabilization, manual therapy, neuromuscular re-education, body mechanics training, flexibility, functional ROM exercises, gait training, home exercise program, joint mobilization, therapeutic activities, stretching, strengthening, spinal/joint mobilization, soft tissue mobilization and postural training. CPT codes to include: 96396, 10704, 76555, 24851, 07238, and 19082.  Frequency: 1 x per week  Visits:  4  Treatment plan discussed with: patient           Progress toward previous goals: Partially Met      Recommendations: Continue as planned  Timeframe: 4 visits  Therapy Charges for Today       Code Description Service Date Service Provider Modifiers Qty    32850001246 HC PT NEUROMUSC RE EDUCATION EA 15 MIN 7/28/2025 Connie Dhillon, PT GP 2    49320974554 HC PT THERAPEUTIC ACT EA 15 MIN 7/28/2025 Connie Dhillon, PT GP 1          PT Signature: Connie Dhillon PT, DPT  [place] License #: [id number]    Based upon review of the patient's progress and continued therapy plan, it is my medical opinion that Geeta Negrete should continue physical therapy treatment at [facility] OP PT at [facility].    Signature: __________________________________  Liseth Garvin CNM

## 2025-08-20 ENCOUNTER — HOSPITAL ENCOUNTER (OUTPATIENT)
Facility: HOSPITAL | Age: 40
Setting detail: THERAPIES SERIES
Discharge: HOME | End: 2025-08-20
Payer: COMMERCIAL

## 2025-08-20 DIAGNOSIS — N81.89 PELVIC FLOOR WEAKNESS: ICD-10-CM

## 2025-08-20 DIAGNOSIS — M62.89 PELVIC FLOOR DYSFUNCTION: Primary | ICD-10-CM

## 2025-08-20 DIAGNOSIS — M62.08 DIASTASIS RECTI: ICD-10-CM

## 2025-08-20 DIAGNOSIS — R39.15 URINARY URGENCY: ICD-10-CM

## 2025-08-20 PROCEDURE — 97161 PT EVAL LOW COMPLEX 20 MIN: CPT

## 2025-08-20 PROCEDURE — 97110 THERAPEUTIC EXERCISES: CPT
